# Patient Record
Sex: MALE | Race: WHITE | NOT HISPANIC OR LATINO | Employment: OTHER | RURAL
[De-identification: names, ages, dates, MRNs, and addresses within clinical notes are randomized per-mention and may not be internally consistent; named-entity substitution may affect disease eponyms.]

---

## 2022-01-11 DIAGNOSIS — M25.511 RIGHT SHOULDER PAIN: Primary | ICD-10-CM

## 2022-01-13 ENCOUNTER — OFFICE VISIT (OUTPATIENT)
Dept: ORTHOPEDICS | Facility: CLINIC | Age: 72
End: 2022-01-13
Payer: MEDICARE

## 2022-01-13 ENCOUNTER — HOSPITAL ENCOUNTER (OUTPATIENT)
Dept: RADIOLOGY | Facility: HOSPITAL | Age: 72
Discharge: HOME | End: 2022-01-13
Attending: ORTHOPAEDIC SURGERY
Payer: MEDICARE

## 2022-01-13 ENCOUNTER — CLINICAL SUPPORT (OUTPATIENT)
Dept: CARDIOLOGY | Facility: CLINIC | Age: 72
End: 2022-01-13
Payer: MEDICARE

## 2022-01-13 VITALS — HEIGHT: 75 IN | BODY MASS INDEX: 27.35 KG/M2 | WEIGHT: 220 LBS

## 2022-01-13 DIAGNOSIS — Z01.810 PREOP CARDIOVASCULAR EXAM: ICD-10-CM

## 2022-01-13 DIAGNOSIS — Z01.812 PRE-PROCEDURE LAB EXAM: ICD-10-CM

## 2022-01-13 DIAGNOSIS — Z01.818 PRE-OP TESTING: ICD-10-CM

## 2022-01-13 DIAGNOSIS — M75.101 ROTATOR CUFF TEAR, RIGHT: Primary | ICD-10-CM

## 2022-01-13 DIAGNOSIS — M25.511 RIGHT SHOULDER PAIN: ICD-10-CM

## 2022-01-13 PROCEDURE — 99204 OFFICE O/P NEW MOD 45 MIN: CPT | Mod: ,,, | Performed by: ORTHOPAEDIC SURGERY

## 2022-01-13 PROCEDURE — 99212 OFFICE O/P EST SF 10 MIN: CPT | Mod: PBBFAC

## 2022-01-13 PROCEDURE — 99204 PR OFFICE/OUTPT VISIT, NEW, LEVL IV, 45-59 MIN: ICD-10-PCS | Mod: ,,, | Performed by: ORTHOPAEDIC SURGERY

## 2022-01-13 PROCEDURE — 73030 X-RAY EXAM OF SHOULDER: CPT | Mod: TC,RT

## 2022-01-13 PROCEDURE — 73030 X-RAY EXAM OF SHOULDER: CPT | Mod: 26,RT,, | Performed by: ORTHOPAEDIC SURGERY

## 2022-01-13 PROCEDURE — 93010 ELECTROCARDIOGRAM REPORT: CPT | Mod: S$PBB,,, | Performed by: INTERNAL MEDICINE

## 2022-01-13 PROCEDURE — 73030 XR SHOULDER COMPLETE 2 OR MORE VIEWS RIGHT: ICD-10-PCS | Mod: 26,RT,, | Performed by: ORTHOPAEDIC SURGERY

## 2022-01-13 PROCEDURE — 93010 EKG 12-LEAD: ICD-10-PCS | Mod: S$PBB,,, | Performed by: INTERNAL MEDICINE

## 2022-01-13 PROCEDURE — 93005 ELECTROCARDIOGRAM TRACING: CPT | Mod: PBBFAC | Performed by: INTERNAL MEDICINE

## 2022-01-13 RX ORDER — ASCORBIC ACID 250 MG
250 TABLET ORAL DAILY
COMMUNITY

## 2022-01-13 RX ORDER — METFORMIN HYDROCHLORIDE 500 MG/1
500 TABLET ORAL 2 TIMES DAILY WITH MEALS
COMMUNITY

## 2022-01-13 RX ORDER — CYCLOBENZAPRINE HCL 10 MG
10 TABLET ORAL 3 TIMES DAILY PRN
COMMUNITY

## 2022-01-13 RX ORDER — CALCIUM CARBONATE 200(500)MG
1 TABLET,CHEWABLE ORAL DAILY
COMMUNITY

## 2022-01-13 RX ORDER — KETOROLAC TROMETHAMINE 10 MG/1
10 TABLET, FILM COATED ORAL EVERY 6 HOURS
Status: ON HOLD | COMMUNITY
End: 2022-01-17 | Stop reason: HOSPADM

## 2022-01-13 RX ORDER — POLYETHYLENE GLYCOL 3350 17 G/17G
17 POWDER, FOR SOLUTION ORAL
COMMUNITY

## 2022-01-13 RX ORDER — NAPROXEN SODIUM 220 MG/1
81 TABLET, FILM COATED ORAL DAILY
COMMUNITY

## 2022-01-13 RX ORDER — ONDANSETRON 8 MG/1
8 TABLET, ORALLY DISINTEGRATING ORAL ONCE
COMMUNITY

## 2022-01-13 RX ORDER — PANTOPRAZOLE SODIUM 40 MG/1
40 TABLET, DELAYED RELEASE ORAL DAILY
COMMUNITY

## 2022-01-13 RX ORDER — CHOLECALCIFEROL (VITAMIN D3) 25 MCG
1000 TABLET ORAL DAILY
COMMUNITY

## 2022-01-13 RX ORDER — INSULIN GLARGINE 100 [IU]/ML
INJECTION, SOLUTION SUBCUTANEOUS NIGHTLY
COMMUNITY

## 2022-01-13 RX ORDER — DIPHENOXYLATE HYDROCHLORIDE AND ATROPINE SULFATE 2.5; .025 MG/1; MG/1
1 TABLET ORAL 4 TIMES DAILY PRN
COMMUNITY

## 2022-01-13 RX ORDER — DOCUSATE SODIUM 100 MG/1
100 CAPSULE, LIQUID FILLED ORAL 2 TIMES DAILY
COMMUNITY

## 2022-01-13 RX ORDER — EZETIMIBE 10 MG/1
10 TABLET ORAL DAILY
COMMUNITY

## 2022-01-13 RX ORDER — LISINOPRIL 2.5 MG/1
2.5 TABLET ORAL DAILY
COMMUNITY

## 2022-01-13 RX ORDER — ALLOPURINOL 300 MG/1
300 TABLET ORAL DAILY
COMMUNITY

## 2022-01-13 RX ORDER — MUPIROCIN 20 MG/G
OINTMENT TOPICAL
Status: CANCELLED | OUTPATIENT
Start: 2022-01-13

## 2022-01-13 RX ORDER — LOTEPREDNOL ETABONATE 2 MG/ML
1 SUSPENSION/ DROPS OPHTHALMIC 4 TIMES DAILY
COMMUNITY

## 2022-01-13 RX ORDER — TRAMADOL HYDROCHLORIDE 50 MG/1
50 TABLET ORAL EVERY 6 HOURS PRN
COMMUNITY

## 2022-01-13 RX ORDER — AMILORIDE HYDROCHLORIDE 5 MG/1
5 TABLET ORAL DAILY
COMMUNITY

## 2022-01-13 RX ORDER — DICLOFENAC SODIUM 30 MG/G
1 GEL TOPICAL 2 TIMES DAILY
COMMUNITY

## 2022-01-13 RX ORDER — ROSUVASTATIN CALCIUM 10 MG/1
10 TABLET, COATED ORAL DAILY
COMMUNITY

## 2022-01-13 RX ORDER — SODIUM CHLORIDE 9 MG/ML
INJECTION, SOLUTION INTRAVENOUS CONTINUOUS
Status: CANCELLED | OUTPATIENT
Start: 2022-01-13

## 2022-01-13 NOTE — PROGRESS NOTES
ASSESSMENT:      ICD-10-CM ICD-9-CM   1. Rotator cuff tear, right  M75.101 840.4   2. Preop cardiovascular exam  Z01.810 V72.81   3. Pre-procedure lab exam  Z01.812 V72.63   4. Pre-op testing  Z01.818 V72.84       PLAN:     -Findings and treatment options were discussed with the patient  -All questions answered  Natural history and expected course discussed. Questions answered.  Educational material distributed.  Reduction in offending activity.  MRI findings were reviewed with the patient.  The patient has a symptomatic full-thickness rotator cuff tear with associated impingement findings.  Muscle quality is well maintained.  Treatment options were reviewed to include both operative and nonoperative measures.  The patient has failed an initial course of conservative treatment.  Given the extent and duration of the patient's complaints, operative intervention is certainly reasonable at this point.  The details of right shoulder arthroscopic rotator cuff repair with subacromial decompression, biceps tenotomy versus tenodesis, labral debridement, distal clavicle excision, and possible Regeneten patch augmentation were discussed.    The patient understands the likely convalescence after surgery, in particular the expected postop rehab and recovery course. Alternatives both operative and non-operative with associated risks and benefits discussed. The outlined risks and potential complications of the proposed procedure include but are not limited to: infection, poor wound healing, scarring, stiffness, swelling, continued or recurrent pain, instability, hardware or prosthetic failure, symptomatic hardware requiring removal, weakness, neurovascular injury, numbness, chronic regional pain disorder, tissue nonhealing/irreparability/retear, contralateral ligament injury, chondral injury, arthritis, fracture, blood clot formation, inability to return to previous level of activity, anesthetic or regional block complication up  to death, need for additional procedure as indicated, and potential need for further surgery.     he was also informed and understands the risks of surgery are greater for patients with a current condition or history of heart disease, obesity, clotting disorders, recurrent infections, steroid use, current or past smoking, and factors such as sedentary lifestyle and noncompliance with medications, therapy or follow-up. The degree of the increased risk is hard to estimate with any degree of precision.    All questions were answered. The patient has verbalized understanding of these issues and wishes to proceed as discussed.   Will proceed with medical clearance.    All questions were answered. The patient has verbalized understanding of these issues and wishes to proceed as discussed. The patient understands that recovery time can be up to 6-12 months.    There are no Patient Instructions on file for this visit.    IMAGING:  No results found.   Radiographs of the right shoulder obtained today demonstrating mild to moderate AC joint space narrowing as well as subtle arthritis of the glenohumeral joint    An MRI was reviewed from [facility] which demonstrates a full-thickness retracted tear of 2 cm involving the supraspinatus and infraspinatus.  Subscapularis appears to have some chronic changes as well and there is evidence of prior subscapularis repair        CC: Shoulder pain   71 y.o. Male who presents as a new patient to me for evaluation of right shoulder pain.    Occupation:  Retired.   Pain has been present for 1 month.  Description of Injury:  He had a fall while hunting.  He landed on his right shoulder.  Has had prior right shoulder arthroscopic surgery including subscapularis repair in 2019.  Pain has persisted over the last month following this fall.  He has had a repeat MRI which is confirmed a large tear of his rotator cuff.  he has had formal physical therapy  he has had previous shoulder  injections.   he has had advanced imaging such as MRI.   The shoulder pain worsens with activity and overhead motion. Pain is disruptive to sleep at night. The pain is better with rest. Treatment thus far has included rest and activity modification. Here today to discuss diagnosis and treatment options.   VAS Pain Scale:  9  SANE Score: 40       PAST MEDICAL HISTORY:   Past Medical History:   Diagnosis Date    Acoustic neuroma     Diabetes     Diabetic neuropathy     Diabetic retinopathy     Glaucoma     Kidney stone     [place] spotted fever      PAST SURGICAL HISTORY:  Past Surgical History:   Procedure Laterality Date    ANTERIOR CRUCIATE LIGAMENT REPAIR Right 2010    appenedectomy  1961    ARTHROSCOPY OF KNEE Right 1986    BIOPSY OF INTESTINE      CARPAL TUNNEL RELEASE Left 2012    CARPAL TUNNEL RELEASE Right 2012    CLOSED REDUCTION OF FRACTURE OF NASAL BONE  2017    EYE SURGERY      GLAUCOMA SURGERY      KNEE ARTHROPLASTY Right 2017    Dr. Malik Patterson     LITHOTRIPSY  2013    PROSTATE BIOPSY      REPAIR OF ACHILLES TENDON Left 1963    ROTATOR CUFF REPAIR Left 2016    dr. Malik Patterson    transurethral ablasion      TRIGGER FINGER RELEASE  2013    URETEROSCOPY  2014     MEDICATIONS:    Current Outpatient Medications:     allopurinoL (ZYLOPRIM) 300 MG tablet, Take 300 mg by mouth once daily., Disp: , Rfl:     aMILoride (MIDAMOR) 5 MG Tab, Take 5 mg by mouth once daily., Disp: , Rfl:     ascorbic acid, vitamin C, (VITAMIN C) 250 MG tablet, Take 250 mg by mouth once daily., Disp: , Rfl:     aspirin 81 MG Chew, Take 81 mg by mouth once daily., Disp: , Rfl:     calcium carbonate (TUMS) 200 mg calcium (500 mg) chewable tablet, Take 1 tablet by mouth once daily., Disp: , Rfl:     cyclobenzaprine (FLEXERIL) 10 MG tablet, Take 10 mg by mouth 3 (three) times daily as needed for Muscle spasms., Disp: , Rfl:     diclofenac sodium (SOLARAZE) 3 % gel, Apply 1 % topically 2 (two) times  daily., Disp: , Rfl:     diphenoxylate-atropine 2.5-0.025 mg (LOMOTIL) 2.5-0.025 mg per tablet, Take 1 tablet by mouth 4 (four) times daily as needed for Diarrhea., Disp: , Rfl:     docusate sodium (COLACE) 100 MG capsule, Take 100 mg by mouth 2 (two) times daily., Disp: , Rfl:     ezetimibe (ZETIA) 10 mg tablet, Take 10 mg by mouth once daily., Disp: , Rfl:     insulin glargine (LANTUS) 100 unit/mL injection, Inject into the skin every evening., Disp: , Rfl:     insulin lispro protamin-lispro 100 unit/mL (50-50) InPn, Inject into the skin., Disp: , Rfl:     ketorolac (TORADOL) 10 mg tablet, Take 10 mg by mouth every 6 (six) hours., Disp: , Rfl:     Lactobacillus rhamnosus GG (CULTURELLE) 10 billion cell capsule, Take 1 capsule by mouth once daily., Disp: , Rfl:     lisinopriL (PRINIVIL,ZESTRIL) 2.5 MG tablet, Take 2.5 mg by mouth once daily., Disp: , Rfl:     loteprednol (ALREX) 0.2 % DrpS, 1 drop 4 (four) times daily., Disp: , Rfl:     metFORMIN (GLUCOPHAGE) 500 MG tablet, Take 500 mg by mouth 2 (two) times daily with meals., Disp: , Rfl:     ondansetron (ZOFRAN-ODT) 8 MG TbDL, Take 8 mg by mouth once., Disp: , Rfl:     pantoprazole (PROTONIX) 40 MG tablet, Take 40 mg by mouth once daily., Disp: , Rfl:     polyethylene glycol (GLYCOLAX) 17 gram PwPk, Take 17 g by mouth., Disp: , Rfl:     rosuvastatin (CRESTOR) 10 MG tablet, Take 10 mg by mouth once daily., Disp: , Rfl:     traMADoL (ULTRAM) 50 mg tablet, Take 50 mg by mouth every 6 (six) hours as needed for Pain., Disp: , Rfl:     vitamin D (VITAMIN D3) 1000 units Tab, Take 1,000 Units by mouth once daily., Disp: , Rfl:   ALLERGIES:  Review of patient's allergies indicates:   Allergen Reactions    Combigan [brimonidine-timolol]     Lescol [fluvastatin]     Lipitor [atorvastatin]     Lumigan [bimatoprost]     Neurontin [gabapentin]     Opioids - morphine analogues     Pravachol [pravastatin]     Zocor [simvastatin]     Zofran [ondansetron  hcl]      REVIEW OF SYSTEMS:  Constitution: Negative. Negative for chills, fever and night sweats.    Hematologic/Lymphatic: Negative for bleeding problem. Does not bruise/bleed easily.   Skin: Negative for dry skin, itching and rash.   Musculoskeletal: Negative for falls. Positive for shoulder pain and muscle weakness.   All other review of symptoms were reviewed and found to be noncontributory.  PHYSICAL EXAMINATION:  General    Nursing note and vitals reviewed.  Constitutional: He is oriented to person, place, and time. He appears well-developed and well-nourished. No distress.   HENT:   Head: Normocephalic and atraumatic.   Neck: Neck supple.   Cardiovascular: Normal rate, regular rhythm and intact distal pulses.    Pulmonary/Chest: Effort normal. No respiratory distress. He exhibits no tenderness.   Abdominal: Soft. He exhibits no distension. There is no abdominal tenderness.   Neurological: He is alert and oriented to person, place, and time. He has normal reflexes. He displays normal reflexes. No cranial nerve deficit. He exhibits normal muscle tone.   Psychiatric: He has a normal mood and affect. His behavior is normal. Judgment and thought content normal.         Right Shoulder Exam     Inspection/Observation   Swelling: present  Scapular Dyskinesia: positive    Tenderness   The patient is tender to palpation of the acromioclavicular joint, supraspinatus, greater tuberosity and biceps tendon.    Crepitus   The patient has crepitus of the AC joint and greater tuberosity.    Range of Motion   Active abduction: abnormal   Extension: abnormal   Forward Flexion: abnormal   Forward Elevation: abnormal  Adduction: abnormal    Tests & Signs   Cross arm: positive  Drop arm: positive  Santoyo test: positive  Impingement: positive  Sulcus: absent  Rotator Cuff Painful Arc/Range: moderate  Anterosuperior Escape: negative  Lag Sign 90 degrees: positive  Lift Off Sign: positive  Belly Press: positive  Bear Hug:  positive  Jerk Test: negative    Other   Sensation: normal    Comments:  Patient demonstrates evidence of pseudoparalysis with limited active forward elevation    Left Shoulder Exam   Left shoulder exam is normal.      Muscle Strength   Right Upper Extremity   Supraspinatus: 3/5   Subscapularis: 3/5   Biceps: 4/5     Reflexes     Right Side   Right Bell's Sign:  absent    Vascular Exam     Right Pulses      Radial:                    2+              Orders Placed This Encounter   Procedures    Basic Metabolic Panel     Standing Status:   Future     Number of Occurrences:   1     Standing Expiration Date:   3/14/2023    COVID-19 Routine Screening     Standing Status:   Future     Number of Occurrences:   1     Standing Expiration Date:   3/14/2023     Order Specific Question:   Is the patient symptomatic?     Answer:   No     Order Specific Question:   Is this needed for pre-procedure or pre-op testing?     Answer:   Yes     Order Specific Question:   Diagnosis:     Answer:   Pre-op testing [935855]    Diet NPO     Specify start time     Standing Status:   Standing     Number of Occurrences:   1    Cleanse with Chlorhexidine (CHG)     Cleanse surgical site area using 2-4% CHG prior to procedure/surgery     Standing Status:   Standing     Number of Occurrences:   1    Place CHERELLE hose     Standing Status:   Standing     Number of Occurrences:   1    Place sequential compression device     Standing Status:   Standing     Number of Occurrences:   1    Full code     Standing Status:   Standing     Number of Occurrences:   1    EKG 12-lead     Standing Status:   Future     Standing Expiration Date:   1/13/2023     Order Specific Question:   Diagnosis     Answer:   Preop cardiovascular exam [551266]    Case Request Operating Room: REPAIR, ROTATOR CUFF, ARTHROSCOPIC, ARTHROSCOPY, SHOULDER, WITH SUBACROMIAL SPACE DECOMPRESSION, ARTHROSCOPY, SHOULDER, WITH DISTAL CLAVICLE EXCISION, REPAIR, TENDON, BICEPS     Order  Specific Question:   Medical Necessity:     Answer:   Medically Non-Urgent [100]     Order Specific Question:   CPT Code:     Answer:   AK SHLDR ARTHROSCOP,PART ACROMIOPLAS [35368]     Order Specific Question:   CPT Code:     Answer:   AK SHLDR ARTHROSCOP,SURG,W/ROTAT CUFF REPR [41716]     Order Specific Question:   CPT Code:     Answer:   AK SHLDR ARTHROSCOP,PART SYNOVECT [35453]     Order Specific Question:   CPT Code:     Answer:   AK ARTHROSCOPY SHOULDER SURGICAL BICEPS TENODESIS [53385]     Order Specific Question:   CPT Code:     Answer:   AK SHLDR ARTHROSCOP,SURG,DIS CLAVICULECTOMY [49672]     Order Specific Question:   Is an on-site pathologist required for this procedure?     Answer:   N/A       Procedures

## 2022-01-13 NOTE — LETTER
Christian Patterson M.D.  [address] [place] [address], [place] [address]  [phone]  [phone]          Dwight Crowley  1950 is scheduled for right shoulder arthroscopy with rotator cuff repair.     The surgery date is 1/17/21    We are requesting the patient have a letter saying he is cleared for this surgery     *If the patient is diabetic, a HgbA1c is required prior to surgery*      The following anti-coagulation medicine should be held prior to surgery:    3 DAYS BEFORE    7 DAYS  -Eliquis            -Plavix                                -Pradaxa     -Effient  -Xarelto     -Brilinta        -Aggrenox    5 DAYS      10 DAYS  -Arixtra      -Herbal/Vits     -Coumadin     -Aspirin 325mg  -Nsaids             Thank you so much for the help in taking care of Dwight Crowley!              Austin Lira/Dr. Christian Patterson M.D.

## 2022-01-13 NOTE — LETTER
Christian Patterson M.D.  [address] [place] [address], [place] [address]  [phone]  [phone]          Diwght Crowley  1950 is scheduled for right shoulder arthroscopy with rotator cuff repair. .     The surgery date is Monday 1/17/22    We are requesting clearance from a cardiac standpoint.              Thank you so much for the help in taking care of Dwight Crowley!          Dr. Christian Patterson M.D.

## 2022-01-13 NOTE — H&P (VIEW-ONLY)
ASSESSMENT:      ICD-10-CM ICD-9-CM   1. Rotator cuff tear, right  M75.101 840.4   2. Preop cardiovascular exam  Z01.810 V72.81   3. Pre-procedure lab exam  Z01.812 V72.63   4. Pre-op testing  Z01.818 V72.84       PLAN:     -Findings and treatment options were discussed with the patient  -All questions answered  Natural history and expected course discussed. Questions answered.  Educational material distributed.  Reduction in offending activity.  MRI findings were reviewed with the patient.  The patient has a symptomatic full-thickness rotator cuff tear with associated impingement findings.  Muscle quality is well maintained.  Treatment options were reviewed to include both operative and nonoperative measures.  The patient has failed an initial course of conservative treatment.  Given the extent and duration of the patient's complaints, operative intervention is certainly reasonable at this point.  The details of right shoulder arthroscopic rotator cuff repair with subacromial decompression, biceps tenotomy versus tenodesis, labral debridement, distal clavicle excision, and possible Regeneten patch augmentation were discussed.    The patient understands the likely convalescence after surgery, in particular the expected postop rehab and recovery course. Alternatives both operative and non-operative with associated risks and benefits discussed. The outlined risks and potential complications of the proposed procedure include but are not limited to: infection, poor wound healing, scarring, stiffness, swelling, continued or recurrent pain, instability, hardware or prosthetic failure, symptomatic hardware requiring removal, weakness, neurovascular injury, numbness, chronic regional pain disorder, tissue nonhealing/irreparability/retear, contralateral ligament injury, chondral injury, arthritis, fracture, blood clot formation, inability to return to previous level of activity, anesthetic or regional block complication up  to death, need for additional procedure as indicated, and potential need for further surgery.     he was also informed and understands the risks of surgery are greater for patients with a current condition or history of heart disease, obesity, clotting disorders, recurrent infections, steroid use, current or past smoking, and factors such as sedentary lifestyle and noncompliance with medications, therapy or follow-up. The degree of the increased risk is hard to estimate with any degree of precision.    All questions were answered. The patient has verbalized understanding of these issues and wishes to proceed as discussed.   Will proceed with medical clearance.    All questions were answered. The patient has verbalized understanding of these issues and wishes to proceed as discussed. The patient understands that recovery time can be up to 6-12 months.    There are no Patient Instructions on file for this visit.    IMAGING:  No results found.   Radiographs of the right shoulder obtained today demonstrating mild to moderate AC joint space narrowing as well as subtle arthritis of the glenohumeral joint    An MRI was reviewed from [facility] which demonstrates a full-thickness retracted tear of 2 cm involving the supraspinatus and infraspinatus.  Subscapularis appears to have some chronic changes as well and there is evidence of prior subscapularis repair        CC: Shoulder pain   71 y.o. Male who presents as a new patient to me for evaluation of right shoulder pain.    Occupation:  Retired.   Pain has been present for 1 month.  Description of Injury:  He had a fall while hunting.  He landed on his right shoulder.  Has had prior right shoulder arthroscopic surgery including subscapularis repair in 2019.  Pain has persisted over the last month following this fall.  He has had a repeat MRI which is confirmed a large tear of his rotator cuff.  he has had formal physical therapy  he has had previous shoulder  injections.   he has had advanced imaging such as MRI.   The shoulder pain worsens with activity and overhead motion. Pain is disruptive to sleep at night. The pain is better with rest. Treatment thus far has included rest and activity modification. Here today to discuss diagnosis and treatment options.   VAS Pain Scale:  9  SANE Score: 40       PAST MEDICAL HISTORY:   Past Medical History:   Diagnosis Date    Acoustic neuroma     Diabetes     Diabetic neuropathy     Diabetic retinopathy     Glaucoma     Kidney stone     [place] spotted fever      PAST SURGICAL HISTORY:  Past Surgical History:   Procedure Laterality Date    ANTERIOR CRUCIATE LIGAMENT REPAIR Right 2010    appenedectomy  1961    ARTHROSCOPY OF KNEE Right 1986    BIOPSY OF INTESTINE      CARPAL TUNNEL RELEASE Left 2012    CARPAL TUNNEL RELEASE Right 2012    CLOSED REDUCTION OF FRACTURE OF NASAL BONE  2017    EYE SURGERY      GLAUCOMA SURGERY      KNEE ARTHROPLASTY Right 2017    Dr. Malik Patterson     LITHOTRIPSY  2013    PROSTATE BIOPSY      REPAIR OF ACHILLES TENDON Left 1963    ROTATOR CUFF REPAIR Left 2016    dr. Malik Patterson    transurethral ablasion      TRIGGER FINGER RELEASE  2013    URETEROSCOPY  2014     MEDICATIONS:    Current Outpatient Medications:     allopurinoL (ZYLOPRIM) 300 MG tablet, Take 300 mg by mouth once daily., Disp: , Rfl:     aMILoride (MIDAMOR) 5 MG Tab, Take 5 mg by mouth once daily., Disp: , Rfl:     ascorbic acid, vitamin C, (VITAMIN C) 250 MG tablet, Take 250 mg by mouth once daily., Disp: , Rfl:     aspirin 81 MG Chew, Take 81 mg by mouth once daily., Disp: , Rfl:     calcium carbonate (TUMS) 200 mg calcium (500 mg) chewable tablet, Take 1 tablet by mouth once daily., Disp: , Rfl:     cyclobenzaprine (FLEXERIL) 10 MG tablet, Take 10 mg by mouth 3 (three) times daily as needed for Muscle spasms., Disp: , Rfl:     diclofenac sodium (SOLARAZE) 3 % gel, Apply 1 % topically 2 (two) times  daily., Disp: , Rfl:     diphenoxylate-atropine 2.5-0.025 mg (LOMOTIL) 2.5-0.025 mg per tablet, Take 1 tablet by mouth 4 (four) times daily as needed for Diarrhea., Disp: , Rfl:     docusate sodium (COLACE) 100 MG capsule, Take 100 mg by mouth 2 (two) times daily., Disp: , Rfl:     ezetimibe (ZETIA) 10 mg tablet, Take 10 mg by mouth once daily., Disp: , Rfl:     insulin glargine (LANTUS) 100 unit/mL injection, Inject into the skin every evening., Disp: , Rfl:     insulin lispro protamin-lispro 100 unit/mL (50-50) InPn, Inject into the skin., Disp: , Rfl:     ketorolac (TORADOL) 10 mg tablet, Take 10 mg by mouth every 6 (six) hours., Disp: , Rfl:     Lactobacillus rhamnosus GG (CULTURELLE) 10 billion cell capsule, Take 1 capsule by mouth once daily., Disp: , Rfl:     lisinopriL (PRINIVIL,ZESTRIL) 2.5 MG tablet, Take 2.5 mg by mouth once daily., Disp: , Rfl:     loteprednol (ALREX) 0.2 % DrpS, 1 drop 4 (four) times daily., Disp: , Rfl:     metFORMIN (GLUCOPHAGE) 500 MG tablet, Take 500 mg by mouth 2 (two) times daily with meals., Disp: , Rfl:     ondansetron (ZOFRAN-ODT) 8 MG TbDL, Take 8 mg by mouth once., Disp: , Rfl:     pantoprazole (PROTONIX) 40 MG tablet, Take 40 mg by mouth once daily., Disp: , Rfl:     polyethylene glycol (GLYCOLAX) 17 gram PwPk, Take 17 g by mouth., Disp: , Rfl:     rosuvastatin (CRESTOR) 10 MG tablet, Take 10 mg by mouth once daily., Disp: , Rfl:     traMADoL (ULTRAM) 50 mg tablet, Take 50 mg by mouth every 6 (six) hours as needed for Pain., Disp: , Rfl:     vitamin D (VITAMIN D3) 1000 units Tab, Take 1,000 Units by mouth once daily., Disp: , Rfl:   ALLERGIES:  Review of patient's allergies indicates:   Allergen Reactions    Combigan [brimonidine-timolol]     Lescol [fluvastatin]     Lipitor [atorvastatin]     Lumigan [bimatoprost]     Neurontin [gabapentin]     Opioids - morphine analogues     Pravachol [pravastatin]     Zocor [simvastatin]     Zofran [ondansetron  hcl]      REVIEW OF SYSTEMS:  Constitution: Negative. Negative for chills, fever and night sweats.    Hematologic/Lymphatic: Negative for bleeding problem. Does not bruise/bleed easily.   Skin: Negative for dry skin, itching and rash.   Musculoskeletal: Negative for falls. Positive for shoulder pain and muscle weakness.   All other review of symptoms were reviewed and found to be noncontributory.  PHYSICAL EXAMINATION:  General    Nursing note and vitals reviewed.  Constitutional: He is oriented to person, place, and time. He appears well-developed and well-nourished. No distress.   HENT:   Head: Normocephalic and atraumatic.   Neck: Neck supple.   Cardiovascular: Normal rate, regular rhythm and intact distal pulses.    Pulmonary/Chest: Effort normal. No respiratory distress. He exhibits no tenderness.   Abdominal: Soft. He exhibits no distension. There is no abdominal tenderness.   Neurological: He is alert and oriented to person, place, and time. He has normal reflexes. He displays normal reflexes. No cranial nerve deficit. He exhibits normal muscle tone.   Psychiatric: He has a normal mood and affect. His behavior is normal. Judgment and thought content normal.         Right Shoulder Exam     Inspection/Observation   Swelling: present  Scapular Dyskinesia: positive    Tenderness   The patient is tender to palpation of the acromioclavicular joint, supraspinatus, greater tuberosity and biceps tendon.    Crepitus   The patient has crepitus of the AC joint and greater tuberosity.    Range of Motion   Active abduction: abnormal   Extension: abnormal   Forward Flexion: abnormal   Forward Elevation: abnormal  Adduction: abnormal    Tests & Signs   Cross arm: positive  Drop arm: positive  Santoyo test: positive  Impingement: positive  Sulcus: absent  Rotator Cuff Painful Arc/Range: moderate  Anterosuperior Escape: negative  Lag Sign 90 degrees: positive  Lift Off Sign: positive  Belly Press: positive  Bear Hug:  positive  Jerk Test: negative    Other   Sensation: normal    Comments:  Patient demonstrates evidence of pseudoparalysis with limited active forward elevation    Left Shoulder Exam   Left shoulder exam is normal.      Muscle Strength   Right Upper Extremity   Supraspinatus: 3/5   Subscapularis: 3/5   Biceps: 4/5     Reflexes     Right Side   Right Bell's Sign:  absent    Vascular Exam     Right Pulses      Radial:                    2+              Orders Placed This Encounter   Procedures    Basic Metabolic Panel     Standing Status:   Future     Number of Occurrences:   1     Standing Expiration Date:   3/14/2023    COVID-19 Routine Screening     Standing Status:   Future     Number of Occurrences:   1     Standing Expiration Date:   3/14/2023     Order Specific Question:   Is the patient symptomatic?     Answer:   No     Order Specific Question:   Is this needed for pre-procedure or pre-op testing?     Answer:   Yes     Order Specific Question:   Diagnosis:     Answer:   Pre-op testing [711945]    Diet NPO     Specify start time     Standing Status:   Standing     Number of Occurrences:   1    Cleanse with Chlorhexidine (CHG)     Cleanse surgical site area using 2-4% CHG prior to procedure/surgery     Standing Status:   Standing     Number of Occurrences:   1    Place CHERELLE hose     Standing Status:   Standing     Number of Occurrences:   1    Place sequential compression device     Standing Status:   Standing     Number of Occurrences:   1    Full code     Standing Status:   Standing     Number of Occurrences:   1    EKG 12-lead     Standing Status:   Future     Standing Expiration Date:   1/13/2023     Order Specific Question:   Diagnosis     Answer:   Preop cardiovascular exam [251963]    Case Request Operating Room: REPAIR, ROTATOR CUFF, ARTHROSCOPIC, ARTHROSCOPY, SHOULDER, WITH SUBACROMIAL SPACE DECOMPRESSION, ARTHROSCOPY, SHOULDER, WITH DISTAL CLAVICLE EXCISION, REPAIR, TENDON, BICEPS     Order  Specific Question:   Medical Necessity:     Answer:   Medically Non-Urgent [100]     Order Specific Question:   CPT Code:     Answer:   N[place] SHLDR ARTHROSCOP,PART ACROMIOPLAS [94118]     Order Specific Question:   CPT Code:     Answer:   [place] SHLDR ARTHROSCOP,SURG,W/ROTAT CUFF REPR [80618]     Order Specific Question:   CPT Code:     Answer:   [place] SHLDR ARTHROSCOP,PART SYNOVECT [96373]     Order Specific Question:   CPT Code:     Answer:   NJ ARTHROSCOPY SHOULDER SURGICAL BICEPS TENODESIS [71234]     Order Specific Question:   CPT Code:     Answer:   N[place] SHLDR ARTHROSCOP,SURG,DIS CLAVICULECTOMY [21382]     Order Specific Question:   Is an on-site pathologist required for this procedure?     Answer:   N/A       Procedures

## 2022-01-17 ENCOUNTER — ANESTHESIA (OUTPATIENT)
Dept: SURGERY | Facility: HOSPITAL | Age: 72
End: 2022-01-17
Payer: MEDICARE

## 2022-01-17 ENCOUNTER — ANESTHESIA EVENT (OUTPATIENT)
Dept: SURGERY | Facility: HOSPITAL | Age: 72
End: 2022-01-17
Payer: MEDICARE

## 2022-01-17 ENCOUNTER — HOSPITAL ENCOUNTER (OUTPATIENT)
Facility: HOSPITAL | Age: 72
Discharge: HOME | End: 2022-01-17
Attending: ORTHOPAEDIC SURGERY | Admitting: ORTHOPAEDIC SURGERY
Payer: MEDICARE

## 2022-01-17 VITALS
DIASTOLIC BLOOD PRESSURE: 67 MMHG | TEMPERATURE: 98 F | BODY MASS INDEX: 28.23 KG/M2 | OXYGEN SATURATION: 90 % | HEART RATE: 64 BPM | HEIGHT: 74 IN | RESPIRATION RATE: 18 BRPM | SYSTOLIC BLOOD PRESSURE: 126 MMHG | WEIGHT: 220 LBS

## 2022-01-17 DIAGNOSIS — M75.101 ROTATOR CUFF TEAR, RIGHT: Primary | ICD-10-CM

## 2022-01-17 DIAGNOSIS — Z01.810 PREOP CARDIOVASCULAR EXAM: ICD-10-CM

## 2022-01-17 DIAGNOSIS — Z01.818 PRE-OP TESTING: ICD-10-CM

## 2022-01-17 PROBLEM — T88.4XXA HARD TO INTUBATE: Status: ACTIVE | Noted: 2022-01-17

## 2022-01-17 LAB
GLUCOSE SERPL-MCNC: 141 MG/DL (ref 70–105)
GLUCOSE SERPL-MCNC: 175 MG/DL (ref 70–105)

## 2022-01-17 PROCEDURE — D9220A PRA ANESTHESIA: Mod: ANES,,, | Performed by: ANESTHESIOLOGY

## 2022-01-17 PROCEDURE — D9220A PRA ANESTHESIA: ICD-10-PCS | Mod: ANES,,, | Performed by: ANESTHESIOLOGY

## 2022-01-17 PROCEDURE — 27000260 *HC AIRWAY ORAL

## 2022-01-17 PROCEDURE — 97161 PT EVAL LOW COMPLEX 20 MIN: CPT

## 2022-01-17 PROCEDURE — 25000003 PHARM REV CODE 250: Performed by: ORTHOPAEDIC SURGERY

## 2022-01-17 PROCEDURE — 29826 PR SHLDR ARTHROSCOP,PART ACROMIOPLAS: ICD-10-PCS | Mod: RT,,, | Performed by: ORTHOPAEDIC SURGERY

## 2022-01-17 PROCEDURE — 64415 PERIPHERAL BLOCK: ICD-10-PCS | Mod: XU,RT,, | Performed by: ANESTHESIOLOGY

## 2022-01-17 PROCEDURE — 29824 PR SHLDR ARTHROSCOP,SURG,DIS CLAVICULECTOMY: ICD-10-PCS | Mod: RT,,, | Performed by: ORTHOPAEDIC SURGERY

## 2022-01-17 PROCEDURE — 27000509 HC TUBE SALEM SUMP ANY SIZE: Performed by: ANESTHESIOLOGY

## 2022-01-17 PROCEDURE — 36000710: Performed by: ORTHOPAEDIC SURGERY

## 2022-01-17 PROCEDURE — 27000716 HC OXISENSOR PROBE, ANY SIZE

## 2022-01-17 PROCEDURE — 27000689 HC BLADE LARYNGOSCOPE ANY SIZE

## 2022-01-17 PROCEDURE — 27000655

## 2022-01-17 PROCEDURE — 29826 SHO ARTHRS SRG DECOMPRESSION: CPT | Mod: RT,,, | Performed by: ORTHOPAEDIC SURGERY

## 2022-01-17 PROCEDURE — 29827 PR SHLDR ARTHROSCOP,SURG,W/ROTAT CUFF REPR: ICD-10-PCS | Mod: RT,,, | Performed by: ORTHOPAEDIC SURGERY

## 2022-01-17 PROCEDURE — 27201480 HC GLIDESCOPE: Performed by: ANESTHESIOLOGY

## 2022-01-17 PROCEDURE — 25000003 PHARM REV CODE 250: Performed by: ANESTHESIOLOGY

## 2022-01-17 PROCEDURE — D9220A PRA ANESTHESIA: ICD-10-PCS | Mod: CRNA,,,

## 2022-01-17 PROCEDURE — 25000003 PHARM REV CODE 250

## 2022-01-17 PROCEDURE — 63600175 PHARM REV CODE 636 W HCPCS: Performed by: ORTHOPAEDIC SURGERY

## 2022-01-17 PROCEDURE — 36000711: Performed by: ORTHOPAEDIC SURGERY

## 2022-01-17 PROCEDURE — 29823 PR SHLDR ARTHROSCOP,EXTEN DEBRIDE: ICD-10-PCS | Mod: RT,,, | Performed by: ORTHOPAEDIC SURGERY

## 2022-01-17 PROCEDURE — 29827 SHO ARTHRS SRG RT8TR CUF RPR: CPT | Mod: RT,,, | Performed by: ORTHOPAEDIC SURGERY

## 2022-01-17 PROCEDURE — 71000016 HC POSTOP RECOV ADDL HR: Performed by: ORTHOPAEDIC SURGERY

## 2022-01-17 PROCEDURE — 29824 SHO ARTHRS SRG DSTL CLAVICLC: CPT | Mod: RT,,, | Performed by: ORTHOPAEDIC SURGERY

## 2022-01-17 PROCEDURE — 63600175 PHARM REV CODE 636 W HCPCS

## 2022-01-17 PROCEDURE — C1889 IMPLANT/INSERT DEVICE, NOC: HCPCS | Performed by: ORTHOPAEDIC SURGERY

## 2022-01-17 PROCEDURE — 29823 SHO ARTHRS SRG XTNSV DBRDMT: CPT | Mod: RT,,, | Performed by: ORTHOPAEDIC SURGERY

## 2022-01-17 PROCEDURE — 27000165 HC TUBE, ETT CUFFED

## 2022-01-17 PROCEDURE — 71000015 HC POSTOP RECOV 1ST HR: Performed by: ORTHOPAEDIC SURGERY

## 2022-01-17 PROCEDURE — D9220A PRA ANESTHESIA: Mod: CRNA,,,

## 2022-01-17 PROCEDURE — C1713 ANCHOR/SCREW BN/BN,TIS/BN: HCPCS | Performed by: ORTHOPAEDIC SURGERY

## 2022-01-17 PROCEDURE — 82962 GLUCOSE BLOOD TEST: CPT

## 2022-01-17 PROCEDURE — 64415 NJX AA&/STRD BRCH PLXS IMG: CPT | Mod: XU,RT,, | Performed by: ANESTHESIOLOGY

## 2022-01-17 PROCEDURE — 27201423 OPTIME MED/SURG SUP & DEVICES STERILE SUPPLY: Performed by: ORTHOPAEDIC SURGERY

## 2022-01-17 PROCEDURE — 27100168 OPTIME MED/SURG SUP & DEVICES NON-STERILE SUPPLY: Performed by: ORTHOPAEDIC SURGERY

## 2022-01-17 PROCEDURE — 71000033 HC RECOVERY, INTIAL HOUR: Performed by: ORTHOPAEDIC SURGERY

## 2022-01-17 PROCEDURE — 27200750 HC INSULATED NEEDLE/ STIMUPLEX

## 2022-01-17 PROCEDURE — 37000008 HC ANESTHESIA 1ST 15 MINUTES: Performed by: ORTHOPAEDIC SURGERY

## 2022-01-17 PROCEDURE — 37000009 HC ANESTHESIA EA ADD 15 MINS: Performed by: ORTHOPAEDIC SURGERY

## 2022-01-17 DEVICE — IMP BIOINDUCTIVE W ARTH DEL LRG: Type: IMPLANTABLE DEVICE | Site: SHOULDER | Status: FUNCTIONAL

## 2022-01-17 DEVICE — IMP TENDON ANCHORS X8: Type: IMPLANTABLE DEVICE | Site: SHOULDER | Status: FUNCTIONAL

## 2022-01-17 DEVICE — IMP ANCHOR BONE 3 W ARTHRO DEL SYS ADVCD: Type: IMPLANTABLE DEVICE | Site: SHOULDER | Status: FUNCTIONAL

## 2022-01-17 DEVICE — IMP SUTURE ANCHOR HEALICOIL RG SA 5.5 W/3 UB BL CB BL BK: Type: IMPLANTABLE DEVICE | Site: SHOULDER | Status: FUNCTIONAL

## 2022-01-17 DEVICE — IMP SUTURE ANCHOR HEALICOIL KL RGNST: Type: IMPLANTABLE DEVICE | Site: SHOULDER | Status: FUNCTIONAL

## 2022-01-17 RX ORDER — BUPIVACAINE HYDROCHLORIDE 5 MG/ML
INJECTION, SOLUTION EPIDURAL; INTRACAUDAL
Status: COMPLETED | OUTPATIENT
Start: 2022-01-17 | End: 2022-01-17

## 2022-01-17 RX ORDER — ROCURONIUM BROMIDE 10 MG/ML
INJECTION, SOLUTION INTRAVENOUS
Status: DISCONTINUED | OUTPATIENT
Start: 2022-01-17 | End: 2022-01-17

## 2022-01-17 RX ORDER — LIDOCAINE HYDROCHLORIDE 20 MG/ML
INJECTION, SOLUTION EPIDURAL; INFILTRATION; INTRACAUDAL; PERINEURAL
Status: DISCONTINUED | OUTPATIENT
Start: 2022-01-17 | End: 2022-01-17

## 2022-01-17 RX ORDER — CEFAZOLIN SODIUM 2 G/50ML
2 SOLUTION INTRAVENOUS
Status: DISCONTINUED | OUTPATIENT
Start: 2022-01-17 | End: 2022-01-17 | Stop reason: HOSPADM

## 2022-01-17 RX ORDER — PROMETHAZINE HYDROCHLORIDE 25 MG/1
25 TABLET ORAL EVERY 8 HOURS PRN
Qty: 30 TABLET | Refills: 0 | Status: SHIPPED | OUTPATIENT
Start: 2022-01-17

## 2022-01-17 RX ORDER — CEFAZOLIN SODIUM 1 G/3ML
INJECTION, POWDER, FOR SOLUTION INTRAMUSCULAR; INTRAVENOUS
Status: DISCONTINUED | OUTPATIENT
Start: 2022-01-17 | End: 2022-01-17

## 2022-01-17 RX ORDER — LIDOCAINE HYDROCHLORIDE 10 MG/ML
1 INJECTION, SOLUTION EPIDURAL; INFILTRATION; INTRACAUDAL; PERINEURAL ONCE
Status: CANCELLED | OUTPATIENT
Start: 2022-01-17 | End: 2022-01-17

## 2022-01-17 RX ORDER — SODIUM CHLORIDE, SODIUM LACTATE, POTASSIUM CHLORIDE, CALCIUM CHLORIDE 600; 310; 30; 20 MG/100ML; MG/100ML; MG/100ML; MG/100ML
INJECTION, SOLUTION INTRAVENOUS CONTINUOUS
Status: CANCELLED | OUTPATIENT
Start: 2022-01-17

## 2022-01-17 RX ORDER — DOCUSATE SODIUM 100 MG/1
100 CAPSULE, LIQUID FILLED ORAL 2 TIMES DAILY
Status: CANCELLED | OUTPATIENT
Start: 2022-01-17

## 2022-01-17 RX ORDER — MIDAZOLAM HYDROCHLORIDE 1 MG/ML
INJECTION INTRAMUSCULAR; INTRAVENOUS
Status: DISCONTINUED | OUTPATIENT
Start: 2022-01-17 | End: 2022-01-17

## 2022-01-17 RX ORDER — OXYCODONE HYDROCHLORIDE 5 MG/1
10 TABLET ORAL EVERY 4 HOURS PRN
Status: CANCELLED | OUTPATIENT
Start: 2022-01-17

## 2022-01-17 RX ORDER — SODIUM CHLORIDE 9 MG/ML
INJECTION, SOLUTION INTRAVENOUS CONTINUOUS
Status: DISCONTINUED | OUTPATIENT
Start: 2022-01-17 | End: 2022-01-17 | Stop reason: HOSPADM

## 2022-01-17 RX ORDER — HYDROMORPHONE HYDROCHLORIDE 2 MG/ML
0.5 INJECTION, SOLUTION INTRAMUSCULAR; INTRAVENOUS; SUBCUTANEOUS EVERY 5 MIN PRN
Status: DISCONTINUED | OUTPATIENT
Start: 2022-01-17 | End: 2022-01-17 | Stop reason: HOSPADM

## 2022-01-17 RX ORDER — FENTANYL CITRATE 50 UG/ML
INJECTION, SOLUTION INTRAMUSCULAR; INTRAVENOUS
Status: DISCONTINUED | OUTPATIENT
Start: 2022-01-17 | End: 2022-01-17

## 2022-01-17 RX ORDER — MEPERIDINE HYDROCHLORIDE 25 MG/ML
25 INJECTION INTRAMUSCULAR; INTRAVENOUS; SUBCUTANEOUS EVERY 10 MIN PRN
Status: DISCONTINUED | OUTPATIENT
Start: 2022-01-17 | End: 2022-01-17 | Stop reason: HOSPADM

## 2022-01-17 RX ORDER — MORPHINE SULFATE 10 MG/ML
4 INJECTION INTRAMUSCULAR; INTRAVENOUS; SUBCUTANEOUS EVERY 5 MIN PRN
Status: DISCONTINUED | OUTPATIENT
Start: 2022-01-17 | End: 2022-01-17 | Stop reason: HOSPADM

## 2022-01-17 RX ORDER — OXYCODONE AND ACETAMINOPHEN 10; 325 MG/1; MG/1
1 TABLET ORAL EVERY 6 HOURS PRN
Qty: 30 TABLET | Refills: 0 | Status: SHIPPED | OUTPATIENT
Start: 2022-01-17

## 2022-01-17 RX ORDER — ONDANSETRON 2 MG/ML
INJECTION INTRAMUSCULAR; INTRAVENOUS
Status: DISCONTINUED | OUTPATIENT
Start: 2022-01-17 | End: 2022-01-17

## 2022-01-17 RX ORDER — MUPIROCIN 20 MG/G
OINTMENT TOPICAL
Status: DISCONTINUED | OUTPATIENT
Start: 2022-01-17 | End: 2022-01-17 | Stop reason: HOSPADM

## 2022-01-17 RX ORDER — DIPHENHYDRAMINE HYDROCHLORIDE 50 MG/ML
INJECTION INTRAMUSCULAR; INTRAVENOUS
Status: DISCONTINUED | OUTPATIENT
Start: 2022-01-17 | End: 2022-01-17

## 2022-01-17 RX ORDER — ACETAMINOPHEN 10 MG/ML
1000 INJECTION, SOLUTION INTRAVENOUS ONCE
Status: CANCELLED | OUTPATIENT
Start: 2022-01-17 | End: 2022-01-17

## 2022-01-17 RX ORDER — PROPOFOL 10 MG/ML
VIAL (ML) INTRAVENOUS
Status: DISCONTINUED | OUTPATIENT
Start: 2022-01-17 | End: 2022-01-17

## 2022-01-17 RX ORDER — ONDANSETRON 4 MG/1
8 TABLET, ORALLY DISINTEGRATING ORAL EVERY 8 HOURS PRN
Status: CANCELLED | OUTPATIENT
Start: 2022-01-17

## 2022-01-17 RX ORDER — PROMETHAZINE HYDROCHLORIDE 25 MG/1
25 TABLET ORAL EVERY 6 HOURS PRN
Status: CANCELLED | OUTPATIENT
Start: 2022-01-17

## 2022-01-17 RX ORDER — DIPHENHYDRAMINE HYDROCHLORIDE 50 MG/ML
25 INJECTION INTRAMUSCULAR; INTRAVENOUS EVERY 6 HOURS PRN
Status: DISCONTINUED | OUTPATIENT
Start: 2022-01-17 | End: 2022-01-17 | Stop reason: HOSPADM

## 2022-01-17 RX ORDER — TRANEXAMIC ACID 100 MG/ML
INJECTION, SOLUTION INTRAVENOUS
Status: DISCONTINUED | OUTPATIENT
Start: 2022-01-17 | End: 2022-01-17

## 2022-01-17 RX ORDER — SODIUM CHLORIDE 0.9 % (FLUSH) 0.9 %
2 SYRINGE (ML) INJECTION
Status: DISCONTINUED | OUTPATIENT
Start: 2022-01-17 | End: 2022-01-17 | Stop reason: HOSPADM

## 2022-01-17 RX ORDER — OXYCODONE HYDROCHLORIDE 5 MG/1
5 TABLET ORAL EVERY 4 HOURS PRN
Status: CANCELLED | OUTPATIENT
Start: 2022-01-17

## 2022-01-17 RX ORDER — EPINEPHRINE CONVENIENCE KIT 1 MG/ML(1)
KIT INTRAMUSCULAR; SUBCUTANEOUS
Status: DISCONTINUED | OUTPATIENT
Start: 2022-01-17 | End: 2022-01-17 | Stop reason: HOSPADM

## 2022-01-17 RX ORDER — DEXAMETHASONE SODIUM PHOSPHATE 4 MG/ML
INJECTION, SOLUTION INTRA-ARTICULAR; INTRALESIONAL; INTRAMUSCULAR; INTRAVENOUS; SOFT TISSUE
Status: DISCONTINUED | OUTPATIENT
Start: 2022-01-17 | End: 2022-01-17

## 2022-01-17 RX ORDER — HYDROMORPHONE HYDROCHLORIDE 2 MG/ML
INJECTION, SOLUTION INTRAMUSCULAR; INTRAVENOUS; SUBCUTANEOUS
Status: DISCONTINUED | OUTPATIENT
Start: 2022-01-17 | End: 2022-01-17

## 2022-01-17 RX ADMIN — PROPOFOL 60 MG: 10 INJECTION, EMULSION INTRAVENOUS at 08:01

## 2022-01-17 RX ADMIN — DIPHENHYDRAMINE HYDROCHLORIDE 12.5 MG: 50 INJECTION, SOLUTION INTRAMUSCULAR; INTRAVENOUS at 08:01

## 2022-01-17 RX ADMIN — PROPOFOL 30 MG: 10 INJECTION, EMULSION INTRAVENOUS at 08:01

## 2022-01-17 RX ADMIN — FENTANYL CITRATE 50 MCG: 50 INJECTION INTRAMUSCULAR; INTRAVENOUS at 08:01

## 2022-01-17 RX ADMIN — DEXAMETHASONE SODIUM PHOSPHATE 4 MG: 4 INJECTION, SOLUTION INTRA-ARTICULAR; INTRALESIONAL; INTRAMUSCULAR; INTRAVENOUS; SOFT TISSUE at 08:01

## 2022-01-17 RX ADMIN — HYDROMORPHONE HYDROCHLORIDE 0.5 MG: 2 INJECTION, SOLUTION INTRAMUSCULAR; INTRAVENOUS; SUBCUTANEOUS at 10:01

## 2022-01-17 RX ADMIN — SODIUM CHLORIDE: 9 INJECTION, SOLUTION INTRAVENOUS at 07:01

## 2022-01-17 RX ADMIN — MIDAZOLAM 2 MG: 1 INJECTION INTRAMUSCULAR; INTRAVENOUS at 08:01

## 2022-01-17 RX ADMIN — HYDROMORPHONE HYDROCHLORIDE 0.5 MG: 2 INJECTION, SOLUTION INTRAMUSCULAR; INTRAVENOUS; SUBCUTANEOUS at 12:01

## 2022-01-17 RX ADMIN — TRANEXAMIC ACID 1000 MG: 100 INJECTION, SOLUTION INTRAVENOUS at 09:01

## 2022-01-17 RX ADMIN — PROPOFOL 100 MG: 10 INJECTION, EMULSION INTRAVENOUS at 08:01

## 2022-01-17 RX ADMIN — SODIUM CHLORIDE: 9 INJECTION, SOLUTION INTRAVENOUS at 09:01

## 2022-01-17 RX ADMIN — SUGAMMADEX 200 MG: 100 INJECTION, SOLUTION INTRAVENOUS at 12:01

## 2022-01-17 RX ADMIN — ROCURONIUM BROMIDE 50 MG: 10 INJECTION, SOLUTION INTRAVENOUS at 08:01

## 2022-01-17 RX ADMIN — CEFAZOLIN 2 G: 1 INJECTION, POWDER, FOR SOLUTION INTRAMUSCULAR; INTRAVENOUS; PARENTERAL at 08:01

## 2022-01-17 RX ADMIN — ONDANSETRON 4 MG: 2 INJECTION INTRAMUSCULAR; INTRAVENOUS at 08:01

## 2022-01-17 RX ADMIN — FENTANYL CITRATE 50 MCG: 50 INJECTION INTRAMUSCULAR; INTRAVENOUS at 10:01

## 2022-01-17 RX ADMIN — LIDOCAINE HYDROCHLORIDE 100 MG: 20 INJECTION, SOLUTION INTRAVENOUS at 08:01

## 2022-01-17 RX ADMIN — BUPIVACAINE HYDROCHLORIDE 13 ML: 5 INJECTION, SOLUTION EPIDURAL; INTRACAUDAL; PERINEURAL at 08:01

## 2022-01-17 RX ADMIN — PROPOFOL 50 MG: 10 INJECTION, EMULSION INTRAVENOUS at 08:01

## 2022-01-17 NOTE — ANESTHESIA PROCEDURE NOTES
Peripheral Block    Patient location during procedure: OR   Block not for primary anesthetic.  Reason for block: at surgeon's request and post-op pain management   Post-op Pain Location: Right  Start time: 1/17/2022 8:28 AM  Timeout: 1/17/2022 8:27 AM   End time: 1/17/2022 8:34 AM    Staffing  Authorizing Provider: Jarret Hirsch MD  Performing Provider: Jarret Hirsch MD    Preanesthetic Checklist  Completed: patient identified, risks and benefits discussed, pre-op evaluation and timeout performed  Peripheral Block  Patient position: supine  Prep: ChloraPrep  Block type: interscalene  Laterality: right  Injection technique: single shot  Needle  Needle localization: nerve stimulator     Assessment  Injection assessment: negative aspiration    Medications:    Medications: BUPivacaine (pf) (MARCAINE) injection 0.5%, 13 mL    Additional Notes  Exparel 133mg plus 0.5% bupivicaine 65mg.   No complications

## 2022-01-17 NOTE — OP NOTE
Surgery Date: 1/17/2022      Surgeon(s) and Role:     * Christian Patterson MD - Primary    Assistant: Austin Lira    Pre-op Diagnosis:   Rotator cuff tear, right [M75.101]    Post-op Diagnosis:  Post-Op Diagnosis Codes:     * Rotator cuff tear, right [M75.101]       Procedure:   1) Revision arthroscopic rotator cuff repair of supraspinatus, infraspinatus tendons  2) Extensive debridement of the glenohumeral joint  3) Subacromial decompression  4) Distal clavicle exicsion    Anesthesia:  Choice + interscalene block    EBL:  * No values recorded between 1/17/2022  9:08 AM and 1/17/2022 12:09 PM *     Implants:   Implant Name Type Inv. Item Serial No.  Lot No. LRB No. Used Action   IMP SUTURE ANCHOR HEALICOIL RG SA 5.5 W/3 UB BL CB BL BK - [id number] [place] IMP SUTURE ANCHOR HEALICOIL RG SA 5.5 W/3 UB BL CB BL BK  [employer] [facility] ([facility] [id number] Right 1 Implanted   IMP SUTURE ANCHOR HEALICOIL RG SA 5.5 W/3 UB BL CB BL BK - [id number] [place] IMP SUTURE ANCHOR HEALICOIL RG SA 5.5 W/3 UB BL CB BL BK  [employer] [facility] ([facility]) [id number] Right 1 Implanted   IMP SUTURE ANCHOR HEALICOIL KL RGNST - [id number] [place] IMP SUTURE ANCHOR HEALICOIL KL RGNST  [employer] [facility] ([facility]) [id number] Right 1 Implanted   IMP SUTURE ANCHOR HEALICOIL KL RGNST - [id number] [place] IMP SUTURE ANCHOR HEALICOIL KL RGNST  [employer] [facility] [employer] ([facility]) [id number] Right 1 Implanted   IMP BIOINDUCTIVE W ARTH DEL LRG - [id number] Implant IMP BIOINDUCTIVE W ARTH DEL LRG  [employer] [facility] ([facility]) [id number] Right 1 Implanted   IMP ANCHOR BONE 3 W ARTHRO DEL SYS ADVCD - [id number] [place] IMP ANCHOR BONE 3 W ARTHRO DEL SYS ADVCD  SMITH \T [employer] ([facility]) [id number] Right 1 Implanted   IMP TENDON ANCHORS X8 - [id number] [employer] IMP TENDON ANCHORS X8  [employer] [facility] ([facility]) [id number] Right 1 Implanted   IMP TENDON ANCHORS X8 - [id number] [employer] IMP TENDON  ANCHORS X8  [employer] \T\ [employer] ([facility]) [id number] Right 1 Implanted       Description of findings:  See below    Complication:   none        Procedure: The patient was taken to the operating room and was initially placed supine.  Anesthesia was administered, as was an interscalene block, and pre-operative antibiotics were given.  A timeout was performed. The patient was then placed in the lateral decubitus position on a bean bag.  All bony prominences were well padded.  An axillary roll was created and appropriately placed.  The arm was then prepped and draped in the usual sterile fashion. The arm was placed into a spider arm ibarra.  A standard posterior portal was then undertaken and a diagnostic arthroscopy was performed.   A large tear of the rotator cuff was immediately evident.  This involved the supraspinatus and infraspinatus.  The subscapularis was intact however.  Extensive mobilization techniques were then undertaken as this tendon did demonstrate significant scarring and adhesions.  Intra-articular release of adhesions was performed.  Release of the superior glenohumeral ligament coracohumeral ligament and middle glenohumeral ligament was then performed.  A release was taken to the inferior glenohumeral ligament as well.  The labrum was debrided circumferentially.  Areas of chondromalacia were debrided with a motorized shaver as well.  The rotator cuff was debrided at its footprint.  The tear type did appear to have torn just lateral to the musculotendinous junction so there was some residual tendon remaining.  There was a significant amount of tendon left attached to the rotator cuff footprint this was felt to be roughly 1.5 cm in size.  After completing the extensive debridement glenohumeral joint attention was turned towards addressing the subacromial space.  Extensive adhesions were released.    The arthroscope was then inserted into the subacromial space.  A lateral portal was  established 2-3 fingerbreadths lateral to the anterolateral edge of the acromion.  The rotator cuff was then visualized.  The tendon quality was graded as poor.  There was delamination is present within the rotator cuff.  The footprint had to be medialized we utilized a bur in order to achieve this.  The footprint was prepared in standard fashion.      The undersurface of the acromion was inspected.  The acromion was classified as Type 2 .  A posterior cutting block technique was utilized to perform a subacromial decompression.  The posterior border of the distal clavicle was used a reference point for a complete decompression, as the acromion was converted from a curved type II-III acromion to a flat, type I acromion.     The distal clavicle was then inspected.  The distal clavicle demonstrated marked arthrosis and narrowing.  A distal clavicle excision was then performed, resecting 8-10 mm of distal clavicle with an arthroscopic chandan. The superior and posterior acromioclavicular ligaments were protected. A smooth resection and thorough decompression was noted at final inspection.    Attention was then turned back towards the rotator cuff and 2 triple loaded 5.5 mm helical anchors were placed adjacent to the articular margin of the humeral head.  Sutures were then sequentially passed and horizontal mattress sutures were able to be utilized and subsequently tied with sliding SMC knots.  Two additional FiberLink anchors were also utilized.  These were then coupled into 25.5 mm helical knotless anchors on the lateral row to complete the double row repair.  To address the poor tendon quality Regeneten patch was utilized.  Will place is from posterior direction and utilized absorbable WILLAM staples to add fix this to the rotator cuff.      This completed the procedure.  Final pictures were taken, and all instruments were removed. Portals were closed with 3-0 monocryl.  Steri-strips were applied, sterile dressings were  placed, and the patient was placed into a shoulder abduction pillow sling.           Disposition:awakened from anesthesia, extubated and taken to the recovery room in a stable condition, having suffered no apparent untoward event.      Large rotator cuff repair protocol

## 2022-01-17 NOTE — ANESTHESIA PREPROCEDURE EVALUATION
01/17/2022  Dwight Crowley is a 71 y.o., male.    Anesthesia Evaluation    I have reviewed the Patient Summary Reports.   I have reviewed the NPO Status.   I have reviewed the Medications.     Review of Systems         Anesthesia Plan  Type of Anesthesia, risks & benefits discussed:  Anesthesia Type:  general, regional    Patient's Preference:   Plan Factors:          Intra-op Monitoring Plan: standard ASA monitors  Intra-op Monitoring Plan Comments:   Post Op Pain Control Plan: multimodal analgesia  Post Op Pain Control Plan Comments:     Induction:   IV  Beta Blocker:         Informed Consent: Patient understands risks and agrees with Anesthesia plan.  Questions answered. Anesthesia consent signed with patient.  ASA Score: 3     Day of Surgery Review of History & Physical:            Ready For Surgery From Anesthesia Perspective.     NPO greater than 8 hours  NAC  Allergies   Combigan [Brimonidine-timolol]  Not Specified  1/13/2022    Lescol [Fluvastatin]  Not Specified  1/13/2022    Lipitor [Atorvastatin]  Not Specified  1/13/2022    Lumigan [Bimatoprost]  Not Specified  1/13/2022    Neurontin [Gabapentin]  Not Specified  1/13/2022    Opioids - Morphine Analogues  Not Specified  1/13/2022    Pravachol [Pravastatin]  Not Specified  1/13/2022    Topamax [Topiramate]  Not Specified  1/17/2022    Zocor [Simvastatin]  Not Specified  1/13/2022    Zofran [Ondansetron Hcl]  Not Specified        1/13/22 EKG: Sinus rhythm with frequent Premature ventricular complexes   Right bundle branch block; 66 bpm     Diabetes Glaucoma   Diabetic neuropathy Diabetic retinopathy   Acoustic neuroma Kidney stone   Kermit spotted fever Diabetic retinopathy   Coronary artery disease      Airway exam deferred (COVID precautions); adequate ROM at neck.    Plan is GETA plus interscalene block

## 2022-01-17 NOTE — TRANSFER OF CARE
"Anesthesia Transfer of Care Note    Patient: Dwight Crowley    Procedure(s) Performed: Procedure(s) (LRB):  REPAIR, ROTATOR CUFF, ARTHROSCOPIC (Right)  ARTHROSCOPY, SHOULDER, WITH SUBACROMIAL SPACE DECOMPRESSION (Right)  ARTHROSCOPY, SHOULDER, WITH DISTAL CLAVICLE EXCISION (Right)  REPAIR, TENDON, BICEPS (Right)    Patient location: PACU    Anesthesia Type: general and regional    Transport from OR: Transported from OR on 6-10 L/min O2 by face mask with adequate spontaneous ventilation    Post pain: adequate analgesia    Post assessment: no apparent anesthetic complications    Post vital signs: stable    Level of consciousness: responds to stimulation and sedated    Nausea/Vomiting: no nausea/vomiting    Complications: none    Transfer of care protocol was followed      Last vitals:   Visit Vitals  BP (!) 90/45 (BP Location: Left arm, Patient Position: Lying)   Pulse 71   Temp 36.6 °C (97.8 °F) (Oral)   Resp 12   Ht 6' 2" (1.88 m)   Wt 99.8 kg (220 lb)   SpO2 (!) 93%   BMI 28.25 kg/m²     "

## 2022-01-17 NOTE — PLAN OF CARE
Large to Massive Rotator Cuff Repair (> 3cm)                                                                                             Post-operative Protocol    Ultra sling for weeks 0-4  Regular sling for weeks 4-8      Phase 0 - (QUIET)  Quiet in sling  Week 0-4 Begin active scapular retraction/protraction exercises with therapist cueing    ROM exercises elbow, hand, wrist    Phase 1 - (PASSIVE)  Pendulums to warm-up beginning week 5  Week 5-8  Supine External Rotation -0°-30° beginning at 5 weeks with progression to full PROM by 8 weeks    Supine Forward Elevation -0°-90° beginning at 5 weeks with progression to full PROM by 8 weeks    *progress to upright as tolerated with ER and FE      Phase 2 - (ACTIVE)  Pendulums to warm-up.  Week 9-12    Active Range of Motion with terminal stretch    Supine External Rotation - after 8 weeks progress GRADUALLY to full   Supine Forward Elevation - after 8 weeks; progress GRADUALLY to full    Begin active biceps   Internal Rotation - Full (begin behind the back)   Begin AROM in supine and progress to upright    Phase 3 - (RESISTED)  Pendulums to warm up and continue with phase 2.   Week 13 External and Internal Rotation    Standing forward punch     Seated rows    Shoulder shrugs and Biceps curls       Weight Training  Keep hands within eyesight, keep elbows bent, no long lever arms.  Week 16    Minimize overhead activities (below shoulder)      (No  press, pull-down behind head, or wide  bench)      Initiation of Interval Sport Programs    Golf    5-6 months  Tennis   7-8 months  Ski   7-8 months      [email or web address]                                                                       Return to Activity   Rotator Cuff Repair      Slin weeks post-operative  Showering: After 3 days, no soaking     Passive Range of Motion: Week 4 until week 8     Active Range of Motion: Weeks 9 to 12     Resistive exercise program: Approximately week 10       Running: Week 13     Gym Program:    o Pulling motions, biceps, and triceps exercises week 16, using light weights and high repetitions  o Pressing motions at 16 to 20 weeks as tolerated     Outdoor Bikin-14 weeks on road bike            Interval Sports Programs: Week 16 to 20  o Throwing  o Swimming  o Golf  o Tennis     Return to Competition: Week 20-24, based on physician clinical examination and >90% strength testing

## 2022-01-17 NOTE — ANESTHESIA PROCEDURE NOTES
Intubation    Date/Time: 1/17/2022 8:33 AM  Performed by: Rah Marina II CRNA  Authorized by: Jarret Hirsch MD     Intubation:     Induction:  Intravenous    Intubated:  Postinduction    Mask Ventilation:  Easy with oral airway    Attempts:  3    Attempted By:  Student    Method of Intubation:  Direct    Blade:  Rosie 4    Laryngeal View Grade: Grade III - only epiglottis visible      Attempted By (2nd Attempt):  CRNA    Method of Intubation (2nd Attempt):  Direct    Blade (2nd Attempt):  Rosie 4    Laryngeal View Grade (2nd Attempt): Grade IIb - only the arytenoids and epiglottis seen      Attempted By (3rd Attempt):  Student    Method of Intubation (3rd Attempt):  Video laryngoscopy    Blade (3rd Attempt):  Glidescope 4    Laryngeal View Grade (3rd Attempt): Grade I - full view of cords      Difficult Airway Encountered?: Yes      Complications:  Desaturation (clinically insignificant)    Airway Device:  Oral endotracheal tube    Airway Device Size:  7.5    Style/Cuff Inflation:  Cuffed (inflated to minimal occlusive pressure)    Tube secured:  23    Secured at:  The lips    Placement Verified By:  Capnometry    Complicating Factors:  Anterior larynx    Findings Post-Intubation:  BS equal bilateral and atraumatic/condition of teeth unchanged

## 2022-01-17 NOTE — DISCHARGE INSTRUCTIONS
"Patient Education       Shoulder Arthroscopy Discharge Instructions   About this topic   Three bones make up your shoulder joint. These are the upper arm bone, the shoulder blade, and the collarbone. The shoulder is a "ball and socket" joint. The "ball" part of the joint is the top part of your upper arm bone. The "socket" part of your joint is a cup shaped indentation in your shoulder blade. A smooth tissue called cartilage lines the ends of the bones. This helps the joint glide easier. Four muscles called the rotator cuff surround the joint. They help with movement and stability of the shoulder joint. With normal wear and tear or other problems, the cartilage, the rotator cuff or other parts can wear down. Then, the joint can become damaged. This can lead to pain and loss of motion.  Surgery may be done when therapy and drugs for pain have not helped and the problem gets worse. Shoulder arthroscopy is done to look at the shoulder joint and see what is causing your shoulder problem. The doctor makes a few small cuts and uses special tools to look inside your shoulder and repair injuries.           What care is needed at home?   · Ask your doctor what you need to do when you go home. Make sure you ask questions if you do not understand what the doctor says. This way you will know what you need to do.  · Your doctor will give you drugs for pain and to prevent infection. Make sure to take all the drugs ordered by your doctor.  · Place an ice pack or a bag of frozen peas wrapped in a towel over the painful part. Never put ice right on the skin. Do not leave the ice on more than 10 to 15 minutes at a time. You may do this a few times a day to help lessen pain and swelling.  · Wear your sling to keep the shoulder supported. Ask your doctor how long you need to use the sling.  · Talk to your doctor about how to care for your cut site. Ask your doctor about:  ? When you should change your bandages  ? When you may take a " bath or shower  ? If you need to be careful with lifting things over 10 pounds (4.5 kg)  ? When you may go back to your normal activities like work or driving  · Be sure to wash your hands before touching your wound or dressing.  · Do any exercises that your doctor shows you to do. Your doctor will probably send you to physical therapy very soon after surgery. It is important to do these exercises to get full recovery of your shoulder.  What follow-up care is needed?   · Your doctor may ask you to make visits to the office to check on your progress. Be sure to keep these visits.  · If you have stitches or staples, you will need to have them taken out. Your doctor will often want to do this in 1 to 2 weeks.  · You may also need to see a physical therapist (PT). The PT will teach you exercises to help you get back your strength and motion.  What drugs may be needed?   The doctor may order drugs to:  · Help with pain and swelling  · Prevent infection  Will physical activity be limited?   You may need to rest your shoulder for a while. You should not do physical activity that makes your health problem worse. If you run, work out, or play sports, you may not be able to do those things until your health problem gets better.   What problems could happen?   · Infection  · Bleeding  · Injury to blood vessels or nerves  · Blood clots  · Ongoing pain and stiffness  · Not able to fix the problem with arthroscopy and need to do an open surgery  What can be done to prevent this health problem?   To prevent injury to your shoulder:  · Return slowly to your regular activities. Doing too much too soon may delay healing or hurt your shoulder again.  · Avoid or use caution when playing sports that will put a lot of strain your shoulder like throwing sports and tennis. Ask your doctor what is safe to do.  · After going through a rehab program, your doctor may suggest that you continue doing activities that will strengthen your  shoulder.  · Stay active and work out to keep your muscles strong and flexible.  · Always warm up slowly and stretch your shoulder before you exercise.  When do I need to call the doctor?   · Signs of infection. These include a fever of 100.4°F (38°C) or higher, chills, pain with passing urine or not able to pass urine, wound that will not heal, vaginal itching or pain.  · Signs of wound infection. These include swelling, redness, warmth around the wound; too much pain when touched; yellowish, greenish, or bloody discharge; foul smell coming from the cut site; cut site opens up.  · Increased numbness or tingling in the arm and hand  · Hand feels cold  · You are not feeling better in 2 to 3 days or you are feeling worse  Teach Back: Helping You Understand   The Teach Back Method helps you understand the information we are giving you. After you talk with the staff, tell them in your own words what you learned. This helps to make sure the staff has described each thing clearly. It also helps to explain things that may have been confusing. Before going home, make sure you can do these  · I can tell you about my procedure.  · I can tell you what may help ease my pain.  · I can tell you how to care for my cut site.  · I can tell you what I will do if I have more pain, numbness, tingling, or swelling, or my fingers are cool or blue.  Where can I learn more?   American Academy [employer] Orth[employer]  https://orthoinfo.aaos.org/en/treatment/shoulder-arthroscopy/   NHS Choices  https://www.nhs.uk/conditions/arthroscopy/   Last Reviewed Date   2020-04-22  Consumer Information Use and Disclaimer   This information is not specific medical advice and does not replace information you receive from your health care provider. This is only a brief summary of general information. It does NOT include all information about conditions, illnesses, injuries, tests, procedures, treatments, therapies, discharge instructions or life-style  choices that may apply to you. You must talk with your health care provider for complete information about your health and treatment options. This information should not be used to decide whether or not to accept your health care providers advice, instructions or recommendations. Only your health care provider has the knowledge and training to provide advice that is right for you.  Copyright   Copyright © 2021 [employer] and its affiliates and/or licensors. All rights reserved.

## 2022-01-18 NOTE — ANESTHESIA POSTPROCEDURE EVALUATION
Anesthesia Post Evaluation    Patient: Dwight Crowley    Procedure(s) Performed: Procedure(s) (LRB):  REPAIR, ROTATOR CUFF, ARTHROSCOPIC (Right)  ARTHROSCOPY, SHOULDER, WITH SUBACROMIAL SPACE DECOMPRESSION (Right)  ARTHROSCOPY, SHOULDER, WITH DISTAL CLAVICLE EXCISION (Right)  REPAIR, TENDON, BICEPS (Right)    Final Anesthesia Type: general      Patient location during evaluation: PACU  Post-procedure vital signs: reviewed and stable  Pain management: adequate  Airway patency: patent  CONCEPCION mitigation strategies: Multimodal analgesia  PONV status at discharge: No PONV  Anesthetic complications: no      Cardiovascular status: hemodynamically stable  Respiratory status: unassisted  Hydration status: euvolemic  Follow-up not needed.          Vitals Value Taken Time   /67 01/17/22 1503   Temp 36.6 °C (97.8 °F) 01/17/22 1246   Pulse 65 01/17/22 1503   Resp 18 01/17/22 1445   SpO2 90 % 01/17/22 1450   Vitals shown include unvalidated device data.      Event Time   Out of Recovery 13:17:48         Pain/Aldo Score: Aldo Score: 9 (1/17/2022  1:05 PM)

## 2022-01-19 NOTE — PLAN OF CARE
Problem: Physical Therapy Goal  Goal: Physical Therapy Goal  Description: Short Term Goals  Independent with don/doff abd pillow  Independent with HEP    Long Term Goals  Independent with upper extremity dressing/ADL's      Outcome: Met

## 2022-01-19 NOTE — PT/OT/SLP EVAL
Physical Therapy Evaluation    Patient Name:  Dwight Crowley   MRN:  [id number]    Recommendations:     Discharge Recommendations:      Discharge Equipment Recommendations: none   Barriers to discharge: None    Assessment:     Dwight Crowley is a 71 y.o. male admitted with a medical diagnosis of <principal problem not specified>.  He presents with the following impairments/functional limitations:  decreased ROM Patient with good understanding of hep.    Rehab Prognosis: Good; patient would benefit from acute skilled PT services to address these deficits and reach maximum level of function.    Recent Surgery: Procedure(s) (LRB):  REPAIR, ROTATOR CUFF, ARTHROSCOPIC (Right)  ARTHROSCOPY, SHOULDER, WITH SUBACROMIAL SPACE DECOMPRESSION (Right)  ARTHROSCOPY, SHOULDER, WITH DISTAL CLAVICLE EXCISION (Right)  REPAIR, TENDON, BICEPS (Right) 1 Day Post-Op    Plan:     During this hospitalization, patient to be seen 1 x/week to address the identified rehab impairments via therapeutic activities and progress toward the following goals:    · Plan of Care Expires:  01/17/22    Subjective     Chief Complaint: post op pain  Patient/Family Comments/goals: plan is to dc home today  Pain/Comfort:  ·      Patients cultural, spiritual, Restorationist conflicts given the current situation:      Living Environment:  Lives with family  Prior to admission, patients level of function was independent.  Equipment used at home: none.  DME owned (not currently used): none.  Upon discharge, patient will have assistance from spouse.    Objective:     Communicated with nurse prior to session.  Patient found supine with    upon PT entry to room.    General Precautions: Standard,     Orthopedic Precautions:    Braces:    Respiratory Status: Room air    Exams:  · na     Functional Mobility:  · Bed Mobility:     · Scooting: independence    Therapeutic Activities and Exercises:   reviewed hep elbow and shoulder rom    AM-PAC 6 CLICK MOBILITY  Total Score:       Patient left supine with call button in reach.    GOALS:   Multidisciplinary Problems     Physical Therapy Goals     Not on file          Multidisciplinary Problems (Resolved)        Problem: Physical Therapy Goal    Goal Priority Disciplines Outcome Goal Variances Interventions   Physical Therapy Goal   (Resolved)     PT, PT/OT Met     Description: Short Term Goals  Independent with don/doff abd pillow  Independent with HEP    Long Term Goals  Independent with upper extremity dressing/ADL's              Problem: Physical Therapy Goal    Goal Priority Disciplines Outcome Goal Variances Interventions   Physical Therapy Goal   (Resolved)     PT, PT/OT Met     Description: Short Term Goals  Independent with don/doff abd pillow  Independent with HEP    Long Term Goals  Independent with upper extremity dressing/ADL's                       History:     Past Medical History:   Diagnosis Date    Acoustic neuroma     Coronary artery disease     Diabetes     Diabetic neuropathy     Diabetic retinopathy     Diabetic retinopathy     diabetic neuropathy     Glaucoma     Kidney stone     [place] spotted fever        Past Surgical History:   Procedure Laterality Date    ANTERIOR CRUCIATE LIGAMENT REPAIR Right 2010    appenedectomy  1961    ARTHROSCOPIC REPAIR OF ROTATOR CUFF OF SHOULDER Right 1/17/2022    Procedure: REPAIR, ROTATOR CUFF, ARTHROSCOPIC;  Surgeon: Christian Patterson MD;  Location: [facility] OR;  Service: Orthopedics;  Laterality: Right;    ARTHROSCOPY OF KNEE Right 1986    ARTHROSCOPY OF SHOULDER WITH DECOMPRESSION OF SUBACROMIAL SPACE Right 1/17/2022    Procedure: ARTHROSCOPY, SHOULDER, WITH SUBACROMIAL SPACE DECOMPRESSION;  Surgeon: Christian Patterson MD;  Location: [facility] OR;  Service: Orthopedics;  Laterality: Right;    ARTHROSCOPY OF SHOULDER WITH REMOVAL OF DISTAL CLAVICLE Right 1/17/2022    Procedure: ARTHROSCOPY, SHOULDER, WITH DISTAL CLAVICLE EXCISION;  Surgeon: Christian Patterson MD;   Location: [facility] ORTHO OR;  Service: Orthopedics;  Laterality: Right;    BICEPS TENDON REPAIR Right 1/17/2022    Procedure: REPAIR, TENDON, BICEPS;  Surgeon: Christian Patterson MD;  Location: [facility] ORTHO OR;  Service: Orthopedics;  Laterality: Right;    BIOPSY OF INTESTINE      CARPAL TUNNEL RELEASE Left 2012    CARPAL TUNNEL RELEASE Right 2012    CLOSED REDUCTION OF FRACTURE OF NASAL BONE  2017    EYE SURGERY      GLAUCOMA SURGERY      KNEE ARTHROPLASTY Right 2017    Dr. Malik Patterson     LITHOTRIPSY  2013    LITHOTRIPSY      PROSTATE BIOPSY      REPAIR OF ACHILLES TENDON Left 1963    ROTATOR CUFF REPAIR Left 2016    dr. Malik Patterson    transurethral ablasion      TRIGGER FINGER RELEASE  2013    URETEROSCOPY  2014       Time Tracking:     PT Received On: 01/17/22  PT Start Time: 1430     PT Stop Time: 1450  PT Total Time (min): 20 min     Billable Minutes: eval 20 min        01/18/2022

## 2022-02-01 ENCOUNTER — OFFICE VISIT (OUTPATIENT)
Dept: ORTHOPEDICS | Facility: CLINIC | Age: 72
End: 2022-02-01
Payer: MEDICARE

## 2022-02-01 DIAGNOSIS — Z98.890 S/P ARTHROSCOPY OF RIGHT SHOULDER: Primary | ICD-10-CM

## 2022-02-01 PROCEDURE — 99024 POSTOP FOLLOW-UP VISIT: CPT | Mod: ,,, | Performed by: ORTHOPAEDIC SURGERY

## 2022-02-01 PROCEDURE — 99024 PR POST-OP FOLLOW-UP VISIT: ICD-10-PCS | Mod: ,,, | Performed by: ORTHOPAEDIC SURGERY

## 2022-02-01 NOTE — PROGRESS NOTES
Repair, Rotator Cuff, Arthroscopic - Right, Arthroscopy, Shoulder, With Subacromial Space Decompression - Right, Arthroscopy, Shoulder, With Distal Clavicle Excision - Right, and Repair, Tendon, Biceps - Right 1/17/2022      Pt is here today for First post-operative followup of his shoulder arthroscopy.  he is doing well.  We have reviewed his findings and discussed plan of care and future treatment options, including the physical therapy plan.      Reports doing well today.PT at [facility].                                                                                PHYSICAL EXAMINATION:     Incision sites healed well. Sling is in appropriate position  No evidence of any erythema, infection or induration  Range of motion of the shoulder:  0-20 degrees external rotation  Biceps tone appears appropriate      IMAGING: no new imaging    No image results found.                                                                                   ASSESSMENT:                                                                                                                                               1. Status post shoulder arthroscopy, doing well.                                                                                                                               PLAN:                                                                                                                                                     1. Continue with PT  2. Emphasized scapular stabilization to improve overall function.  3. I have discussed return to activity in detail.  4. he will see us back in 4 weeks.                                      5. All questions were answered and he should contact us if he  has any questions or concerns in the interim.              There are no Patient Instructions on file for this visit.

## 2022-03-08 ENCOUNTER — OFFICE VISIT (OUTPATIENT)
Dept: ORTHOPEDICS | Facility: CLINIC | Age: 72
End: 2022-03-08
Payer: MEDICARE

## 2022-03-08 DIAGNOSIS — Z98.890 S/P ARTHROSCOPY OF SHOULDER: Primary | ICD-10-CM

## 2022-03-08 PROCEDURE — 99024 PR POST-OP FOLLOW-UP VISIT: ICD-10-PCS | Mod: ,,, | Performed by: ORTHOPAEDIC SURGERY

## 2022-03-08 PROCEDURE — 99024 POSTOP FOLLOW-UP VISIT: CPT | Mod: ,,, | Performed by: ORTHOPAEDIC SURGERY

## 2022-03-08 NOTE — PROGRESS NOTES
Repair, Rotator Cuff, Arthroscopic - Right, Arthroscopy, Shoulder, With Subacromial Space Decompression - Right, Arthroscopy, Shoulder, With Distal Clavicle Excision - Right, and Repair, Tendon, Biceps - Right 1/17/2022      Pt is here today for Second post-operative followup of his shoulder arthroscopy.  he is doing well.  We have reviewed his findings and discussed plan of care and future treatment options, including the physical therapy plan.      Pt is doing well since surgery, he is doing therapy twice a week, no complaints of pain .                                                                               PHYSICAL EXAMINATION:     Incision sites healed well. Sling is in appropriate position  No evidence of any erythema, infection or induration  Range of motion of the shoulder: full passive ROM  Biceps tone appears appropriate      IMAGING: no new imaging    No image results found.                                                                                   ASSESSMENT:                                                                                                                                               1. Status post shoulder arthroscopy, doing well.                                                                                                                               PLAN:                                                                                                                                                     1. Continue with PT  2. Emphasized scapular stabilization to improve overall function.  3. I have discussed return to activity in detail.  4. he will see us back in 4 weeks.                                      5. All questions were answered and he should contact us if he  has any questions or concerns in the interim.              There are no Patient Instructions on file for this visit.

## 2022-04-21 ENCOUNTER — OFFICE VISIT (OUTPATIENT)
Dept: ORTHOPEDICS | Facility: CLINIC | Age: 72
End: 2022-04-21
Payer: MEDICARE

## 2022-04-21 DIAGNOSIS — Z98.890 S/P ARTHROSCOPY OF SHOULDER: Primary | ICD-10-CM

## 2022-04-21 PROCEDURE — 99212 PR OFFICE/OUTPT VISIT, EST, LEVL II, 10-19 MIN: ICD-10-PCS | Mod: ,,, | Performed by: ORTHOPAEDIC SURGERY

## 2022-04-21 PROCEDURE — 99212 OFFICE O/P EST SF 10 MIN: CPT | Mod: ,,, | Performed by: ORTHOPAEDIC SURGERY

## 2022-04-21 NOTE — PROGRESS NOTES
CC:  Shoulder pain  72 y.o. Male returns to clinic for a follow up visit regarding     ICD-10-CM ICD-9-CM   1. S/P arthroscopy of shoulder  Z98.890 V45.89       Pt is doing well since his surgery, he is still doing therapy twice a week.       REVIEW OF SYSTEMS:   Constitution: Negative. Negative for chills, fever and night sweats.    Hematologic/Lymphatic: Negative for bleeding problem. Does not bruise/bleed easily.   Skin: Negative for dry skin, itching and rash.   Musculoskeletal: Negative for falls. Positive for hip pain and muscle weakness.   All other review of symptoms were reviewed and found to be noncontributory.   PAST MEDICAL HISTORY:   Past Medical History:   Diagnosis Date    Acoustic neuroma     Coronary artery disease     Diabetes     Diabetic neuropathy     Diabetic retinopathy     Diabetic retinopathy     diabetic neuropathy     Glaucoma     Kidney stone     Ben Mountain spotted fever      PAST SURGICAL HISTORY:   Past Surgical History:   Procedure Laterality Date    ANTERIOR CRUCIATE LIGAMENT REPAIR Right 2010    appenedectomy  1961    ARTHROSCOPIC REPAIR OF ROTATOR CUFF OF SHOULDER Right 1/17/2022    Procedure: REPAIR, ROTATOR CUFF, ARTHROSCOPIC;  Surgeon: Christian Patterson MD;  Location: [facility];  Service: Orthopedics;  Laterality: Right;    ARTHROSCOPY OF KNEE Right 1986    ARTHROSCOPY OF SHOULDER WITH DECOMPRESSION OF SUBACROMIAL SPACE Right 1/17/2022    Procedure: ARTHROSCOPY, SHOULDER, WITH SUBACROMIAL SPACE DECOMPRESSION;  Surgeon: Christian Patterson MD;  Location: [facility];  Service: Orthopedics;  Laterality: Right;    ARTHROSCOPY OF SHOULDER WITH REMOVAL OF DISTAL CLAVICLE Right 1/17/2022    Procedure: ARTHROSCOPY, SHOULDER, WITH DISTAL CLAVICLE EXCISION;  Surgeon: Christian Patterson MD;  Location: [facility];  Service: Orthopedics;  Laterality: Right;    BICEPS TENDON REPAIR Right 1/17/2022    Procedure: REPAIR, TENDON, BICEPS;  Surgeon: Christian Patterson MD;   Location: [facility] OR;  Service: Orthopedics;  Laterality: Right;    BIOPSY OF INTESTINE      CARPAL TUNNEL RELEASE Left 2012    CARPAL TUNNEL RELEASE Right 2012    CLOSED REDUCTION OF FRACTURE OF NASAL BONE  2017    EYE SURGERY      GLAUCOMA SURGERY      KNEE ARTHROPLASTY Right 2017    Dr. Malik Patterson     LITHOTRIPSY  2013    LITHOTRIPSY      PROSTATE BIOPSY      REPAIR OF ACHILLES TENDON Left 1963    ROTATOR CUFF REPAIR Left 2016    dr. Malik Patterson    transurethral ablasion      TRIGGER FINGER RELEASE  2013    URETEROSCOPY  2014         PHYSICAL EXAMINATION:  Ortho/SPM Exam  Excellent range of motion of the right shoulder.  Full forward elevation full external rotation is seen today.  Rotator cuff strength graded 4/5.    IMAGING:  No results found.       ASSESSMENT:      ICD-10-CM ICD-9-CM   1. S/P arthroscopy of shoulder  Z98.890 V45.89       PLAN:     -Findings and treatment options were discussed with the patient  -All questions answered  Natural history and expected course discussed. Questions answered.  Educational material distributed.  Reduction in offending activity.  Physical therapy referral.   Overall doing quite well.  Will see back in about 6 weeks time for recheck.      There are no Patient Instructions on file for this visit.    Orders Placed This Encounter   Procedures    Ambulatory referral/consult to Physical/Occupational Therapy       Procedures

## 2022-06-02 ENCOUNTER — OFFICE VISIT (OUTPATIENT)
Dept: ORTHOPEDICS | Facility: CLINIC | Age: 72
End: 2022-06-02
Payer: MEDICARE

## 2022-06-02 DIAGNOSIS — Z98.890 S/P ARTHROSCOPY OF SHOULDER: Primary | ICD-10-CM

## 2022-06-02 PROCEDURE — 99213 OFFICE O/P EST LOW 20 MIN: CPT | Mod: ,,, | Performed by: ORTHOPAEDIC SURGERY

## 2022-06-02 PROCEDURE — 99213 PR OFFICE/OUTPT VISIT, EST, LEVL III, 20-29 MIN: ICD-10-PCS | Mod: ,,, | Performed by: ORTHOPAEDIC SURGERY

## 2022-06-02 NOTE — PROGRESS NOTES
CC:  Knee pain    72 y.o. Male returns to clinic for a follow up visit regarding knee pain.      Pt is 4 1/2 months right shoulder arthroscopy. He is doing well, finished with therapy. He has great motion. No pain.   SANE: 90  Preop SANE: 0     Past Medical History:   Diagnosis Date    Acoustic neuroma     Coronary artery disease     Diabetes     Diabetic neuropathy     Diabetic retinopathy     Diabetic retinopathy     diabetic neuropathy     Glaucoma     Kidney stone     [place] spotted fever      Past Surgical History:   Procedure Laterality Date    ANTERIOR CRUCIATE LIGAMENT REPAIR Right 2010    appenedectomy  1961    ARTHROSCOPIC REPAIR OF ROTATOR CUFF OF SHOULDER Right 1/17/2022    Procedure: REPAIR, ROTATOR CUFF, ARTHROSCOPIC;  Surgeon: Christian Patterson MD;  Location: [facility] OR;  Service: Orthopedics;  Laterality: Right;    ARTHROSCOPY OF KNEE Right 1986    ARTHROSCOPY OF SHOULDER WITH DECOMPRESSION OF SUBACROMIAL SPACE Right 1/17/2022    Procedure: ARTHROSCOPY, SHOULDER, WITH SUBACROMIAL SPACE DECOMPRESSION;  Surgeon: Christian Patterson MD;  Location: [facility] OR;  Service: Orthopedics;  Laterality: Right;    ARTHROSCOPY OF SHOULDER WITH REMOVAL OF DISTAL CLAVICLE Right 1/17/2022    Procedure: ARTHROSCOPY, SHOULDER, WITH DISTAL CLAVICLE EXCISION;  Surgeon: Christian Patterson MD;  Location: [facility] OR;  Service: Orthopedics;  Laterality: Right;    BICEPS TENDON REPAIR Right 1/17/2022    Procedure: REPAIR, TENDON, BICEPS;  Surgeon: Christian Patterson MD;  Location: [facility] OR;  Service: Orthopedics;  Laterality: Right;    BIOPSY OF INTESTINE      CARPAL TUNNEL RELEASE Left 2012    CARPAL TUNNEL RELEASE Right 2012    CLOSED REDUCTION OF FRACTURE OF NASAL BONE  2017    EYE SURGERY      GLAUCOMA SURGERY      KNEE ARTHROPLASTY Right 2017    Dr. Malik Patterson     LITHOTRIPSY  2013    LITHOTRIPSY      PROSTATE BIOPSY      REPAIR OF ACHILLES TENDON Left 1963    ROTATOR  CUFF REPAIR Left 2016    dr. Malik Patterson    transurethral ablasion      TRIGGER FINGER RELEASE  2013    URETEROSCOPY  2014         REVIEW OF SYSTEMS:   Constitution: Negative. Negative for chills, fever and night sweats.    Hematologic/Lymphatic: Negative for bleeding problem. Does not bruise/bleed easily.   Skin: Negative for dry skin, itching and rash.   Musculoskeletal: Negative for falls. Positive for knee pain and muscle weakness.   All other review of symptoms were reviewed and found to be noncontributory.     PHYSICAL EXAMINATION:  There were no vitals taken for this visit.  General    Nursing note and vitals reviewed.  Constitutional: He is oriented to person, place, and time. He appears well-developed and well-nourished.   HENT:   Head: Normocephalic and atraumatic.   Nose: Nose normal.   Eyes: EOM are normal. Pupils are equal, round, and reactive to light.   Neck: Neck supple.   Cardiovascular: Normal rate and intact distal pulses.    Pulmonary/Chest: Effort normal. No respiratory distress. He exhibits no tenderness.   Abdominal: Soft. He exhibits no distension. There is no abdominal tenderness.   Neurological: He is alert and oriented to person, place, and time. He has normal reflexes.   Psychiatric: He has a normal mood and affect. His behavior is normal. Judgment and thought content normal.         Right Shoulder Exam     Inspection/Observation   Scars: present  Deformity: absent  Scapular Dyskinesia: negative    Range of Motion   Active abduction: normal   Passive abduction: normal   Extension: normal   Forward Flexion: normal   Adduction: normal  External Rotation 90 degrees: normal  Internal rotation 0 degrees: abnormal     Tests & Signs   Santoyo test: negative  Impingement: negative  Belly Press: negative    Other   Sensation: normal    Muscle Strength   Right Upper Extremity   Supraspinatus: 5/5   Subscapularis: 5/5   Biceps: 5/5         IMAGING:  No results found.     ASSESSMENT:      ICD-10-CM  ICD-9-CM   1. S/P arthroscopy of shoulder  Z98.890 V45.89       PLAN:     -Findings and treatment options were discussed with the patient  -All questions answered  Natural history and expected course discussed. Questions answered.  Educational materials distributed.   He is doing great at this point.  I would like to see him back in 3 months time to have a final recheck.  I am very pleased with his progress today.      There are no Patient Instructions on file for this visit.      No orders of the defined types were placed in this encounter.        Procedures

## 2022-07-20 DIAGNOSIS — M25.529 ELBOW PAIN, UNSPECIFIED LATERALITY: Primary | ICD-10-CM

## 2022-07-26 ENCOUNTER — HOSPITAL ENCOUNTER (OUTPATIENT)
Dept: RADIOLOGY | Facility: HOSPITAL | Age: 72
Discharge: HOME | End: 2022-07-26
Attending: ORTHOPAEDIC SURGERY
Payer: MEDICARE

## 2022-07-26 ENCOUNTER — OFFICE VISIT (OUTPATIENT)
Dept: ORTHOPEDICS | Facility: CLINIC | Age: 72
End: 2022-07-26
Payer: MEDICARE

## 2022-07-26 DIAGNOSIS — M70.22 OLECRANON BURSITIS OF LEFT ELBOW: Primary | ICD-10-CM

## 2022-07-26 DIAGNOSIS — M25.529 ELBOW PAIN, UNSPECIFIED LATERALITY: ICD-10-CM

## 2022-07-26 PROCEDURE — 99214 PR OFFICE/OUTPT VISIT, EST, LEVL IV, 30-39 MIN: ICD-10-PCS | Mod: ,,, | Performed by: ORTHOPAEDIC SURGERY

## 2022-07-26 PROCEDURE — 73070 X-RAY EXAM OF ELBOW: CPT | Mod: 26,LT,, | Performed by: ORTHOPAEDIC SURGERY

## 2022-07-26 PROCEDURE — 73070 XR ELBOW 2 VIEWS LEFT: ICD-10-PCS | Mod: 26,LT,, | Performed by: ORTHOPAEDIC SURGERY

## 2022-07-26 PROCEDURE — 99214 OFFICE O/P EST MOD 30 MIN: CPT | Mod: ,,, | Performed by: ORTHOPAEDIC SURGERY

## 2022-07-26 PROCEDURE — 73070 X-RAY EXAM OF ELBOW: CPT | Mod: TC,LT

## 2022-07-26 RX ORDER — CLINDAMYCIN HYDROCHLORIDE 300 MG/1
300 CAPSULE ORAL 3 TIMES DAILY
Qty: 21 CAPSULE | Refills: 0 | Status: SHIPPED | OUTPATIENT
Start: 2022-07-26 | End: 2022-08-02

## 2022-07-26 NOTE — PROGRESS NOTES
72 y.o. Male returns to clinic for a follow up visit regarding     ICD-10-CM ICD-9-CM   1. Olecranon bursitis of left elbow  M70.22 726.33       Pt is here today for follow up visit of left elbow olecranon bursitis.  He was diagnosed with septic olecranon bursitis near road trip to [place].  He had a aspiration of the olecranon bursa while at a hospital in [place].  He was treated and when sitting as well.  He has received single dose of Rocephin as well as clindamycin.  Reports that his left elbow is feeling better at this time.     Past Medical History:   Diagnosis Date    Acoustic neuroma     Coronary artery disease     Diabetes     Diabetic neuropathy     Diabetic retinopathy     Diabetic retinopathy     diabetic neuropathy     Glaucoma     Kidney stone     [place] spotted fever      Past Surgical History:   Procedure Laterality Date    ANTERIOR CRUCIATE LIGAMENT REPAIR Right 2010    appenedectomy  1961    ARTHROSCOPIC REPAIR OF ROTATOR CUFF OF SHOULDER Right 1/17/2022    Procedure: REPAIR, ROTATOR CUFF, ARTHROSCOPIC;  Surgeon: Christian Patterson MD;  Location: [facility] OR;  Service: Orthopedics;  Laterality: Right;    ARTHROSCOPY OF KNEE Right 1986    ARTHROSCOPY OF SHOULDER WITH DECOMPRESSION OF SUBACROMIAL SPACE Right 1/17/2022    Procedure: ARTHROSCOPY, SHOULDER, WITH SUBACROMIAL SPACE DECOMPRESSION;  Surgeon: Christian Pattersno MD;  Location: [facility] OR;  Service: Orthopedics;  Laterality: Right;    ARTHROSCOPY OF SHOULDER WITH REMOVAL OF DISTAL CLAVICLE Right 1/17/2022    Procedure: ARTHROSCOPY, SHOULDER, WITH DISTAL CLAVICLE EXCISION;  Surgeon: Christian Patterson MD;  Location: [facility] OR;  Service: Orthopedics;  Laterality: Right;    BICEPS TENDON REPAIR Right 1/17/2022    Procedure: REPAIR, TENDON, BICEPS;  Surgeon: Christian Patterson MD;  Location: [facility] OR;  Service: Orthopedics;  Laterality: Right;    BIOPSY OF INTESTINE      CARPAL TUNNEL RELEASE Left  2012    CARPAL TUNNEL RELEASE Right 2012    CLOSED REDUCTION OF FRACTURE OF NASAL BONE  2017    EYE SURGERY      GLAUCOMA SURGERY      KNEE ARTHROPLASTY Right 2017    Dr. Malik Patterson     LITHOTRIPSY  2013    LITHOTRIPSY      PROSTATE BIOPSY      REPAIR OF ACHILLES TENDON Left 1963    ROTATOR CUFF REPAIR Left 2016    dr. Malik Patterson    transurethral ablasion      TRIGGER FINGER RELEASE  2013    URETEROSCOPY  2014         REVIEW OF SYSTEMS:   Constitution: Negative. Negative for chills, fever and night sweats.    Hematologic/Lymphatic: Negative for bleeding problem. Does not bruise/bleed easily.   Skin: Negative for dry skin, itching and rash.   Musculoskeletal: Negative for falls. Positive for knee pain and muscle weakness.   All other review of symptoms were reviewed and found to be noncontributory.     PHYSICAL EXAMINATION:  There were no vitals taken for this visit.  Ortho/SPM Exam  I inspected his left elbow today.  He does have a degree of redness present along the posterior aspect of the left elbow.  There is no evidence of fluctuance and no particular fluid collections that are drainable at this time.  Appears to be more consistent with cellulitis    IMAGING:  X-Ray Elbow 2 Views Left  Radiographs left elbow were obtained today demonstrating the parents of mild to moderate degenerative changes within the ulnohumeral articulation.  There is also a small soft tissue shadow seen which may be consistent with patient's olecranon bursitis      ASSESSMENT:      ICD-10-CM ICD-9-CM   1. Olecranon bursitis of left elbow  M70.22 726.33       PLAN:     -Findings and treatment options were discussed with the patient  -All questions answered  Min recommend an additional 7 day course of clindamycin to resolve this bursitis and cellulitis.  I will see him back in 7 days.  Recommend no surgical intervention at this time.        There are no Patient Instructions on file for this visit.      No orders of the defined  types were placed in this encounter.        Procedures

## 2022-08-02 ENCOUNTER — OFFICE VISIT (OUTPATIENT)
Dept: ORTHOPEDICS | Facility: CLINIC | Age: 72
End: 2022-08-02
Payer: MEDICARE

## 2022-08-02 DIAGNOSIS — M70.22 OLECRANON BURSITIS OF LEFT ELBOW: Primary | ICD-10-CM

## 2022-08-02 PROCEDURE — 99212 PR OFFICE/OUTPT VISIT, EST, LEVL II, 10-19 MIN: ICD-10-PCS | Mod: ,,, | Performed by: ORTHOPAEDIC SURGERY

## 2022-08-02 PROCEDURE — 99212 OFFICE O/P EST SF 10 MIN: CPT | Mod: ,,, | Performed by: ORTHOPAEDIC SURGERY

## 2022-08-02 NOTE — PROGRESS NOTES
72 y.o. Male returns to clinic for a follow up visit regarding     ICD-10-CM ICD-9-CM   1. Olecranon bursitis of left elbow  M70.22 726.33       Pt is here for 1 week follow up left elbow olecranon bursitis. He was started on clindamycin last week and reports it seems to be improving. Pain is not as bad, swelling is down as well.        Past Medical History:   Diagnosis Date    Acoustic neuroma     Coronary artery disease     Diabetes     Diabetic neuropathy     Diabetic retinopathy     Diabetic retinopathy     diabetic neuropathy     Glaucoma     Kidney stone     [place] spotted fever      Past Surgical History:   Procedure Laterality Date    ANTERIOR CRUCIATE LIGAMENT REPAIR Right 2010    appenedectomy  1961    ARTHROSCOPIC REPAIR OF ROTATOR CUFF OF SHOULDER Right 1/17/2022    Procedure: REPAIR, ROTATOR CUFF, ARTHROSCOPIC;  Surgeon: Christian Patterson MD;  Location: [facility] OR;  Service: Orthopedics;  Laterality: Right;    ARTHROSCOPY OF KNEE Right 1986    ARTHROSCOPY OF SHOULDER WITH DECOMPRESSION OF SUBACROMIAL SPACE Right 1/17/2022    Procedure: ARTHROSCOPY, SHOULDER, WITH SUBACROMIAL SPACE DECOMPRESSION;  Surgeon: Christian Patterson MD;  Location: [facility] OR;  Service: Orthopedics;  Laterality: Right;    ARTHROSCOPY OF SHOULDER WITH REMOVAL OF DISTAL CLAVICLE Right 1/17/2022    Procedure: ARTHROSCOPY, SHOULDER, WITH DISTAL CLAVICLE EXCISION;  Surgeon: Christian Patterson MD;  Location: [facility] OR;  Service: Orthopedics;  Laterality: Right;    BICEPS TENDON REPAIR Right 1/17/2022    Procedure: REPAIR, TENDON, BICEPS;  Surgeon: Christian Patterson MD;  Location: [facility] OR;  Service: Orthopedics;  Laterality: Right;    BIOPSY OF INTESTINE      CARPAL TUNNEL RELEASE Left 2012    CARPAL TUNNEL RELEASE Right 2012    CLOSED REDUCTION OF FRACTURE OF NASAL BONE  2017    EYE SURGERY      GLAUCOMA SURGERY      KNEE ARTHROPLASTY Right 2017    Dr. Malik Patterson     LITHOTRIPSY   2013    LITHOTRIPSY      PROSTATE BIOPSY      REPAIR OF ACHILLES TENDON Left 1963    ROTATOR CUFF REPAIR Left 2016    dr. Malik Patterson    transurethral ablasion      TRIGGER FINGER RELEASE  2013    URETEROSCOPY  2014         REVIEW OF SYSTEMS:   All other review of symptoms were reviewed and found to be noncontributory.     PHYSICAL EXAMINATION:  There were no vitals taken for this visit.  Ortho/SPM Exam    He still has moderate swelling along the olecranon bursa and very mild erythema but no induration or fluctuance or redness.  IMAGING:    No new xrays  ASSESSMENT:      ICD-10-CM ICD-9-CM   1. Olecranon bursitis of left elbow  M70.22 726.33       PLAN:     -Findings and treatment options were discussed with the patient  -All questions answered  He has responded well antibiotics.  He is instructed return to clinic if this olecranon bursitis does recur.  Continue to resume all activity as tolerated.  Will follow-up as needed.        There are no Patient Instructions on file for this visit.      No orders of the defined types were placed in this encounter.        Procedures

## (undated) DEVICE — TUBING SUCTION 5MM STERILE 3/16IN X 12FT

## (undated) DEVICE — MARKER SURGICAL PEN & RULER STERILE

## (undated) DEVICE — GLOVE SURGICAL PROTEXIS PI BLUE SIZE 8.5

## (undated) DEVICE — NEEDLE SPINAL 18G X 3 1/2IN

## (undated) DEVICE — Device

## (undated) DEVICE — SHAVER SYNNOVATOR PLAT SERIES 4.5MM

## (undated) DEVICE — SLING ARM ULTRA III LG

## (undated) DEVICE — GLOVE SURGICAL PROTEXIS PI CLASSIC SIZE 8.0

## (undated) DEVICE — WATER BOOM FLOOR SUCTION STRIP

## (undated) DEVICE — STOCKINETTE IMPERVIOUS MEDIUM STL

## (undated) DEVICE — BLADE HELICUT TM

## (undated) DEVICE — ADHESIVE LIQUID MASTISOL 2/3CC

## (undated) DEVICE — WAND TURBOVAC SUP AMBIENT 90 IF S

## (undated) DEVICE — COVER LIGHT HANDLE FLEX PK/2

## (undated) DEVICE — SUTURE GRASPER 60 DEG IDEAL

## (undated) DEVICE — INCISOR BOXED PLUS BL

## (undated) DEVICE — GLOVE SURGICAL PROTEXIS PI CLASSIC SIZE 7.0

## (undated) DEVICE — SOL IRRIGATION SALINE 3000ML BAG

## (undated) DEVICE — SUTURE PASSER SELF STR

## (undated) DEVICE — GAUZE XEROFORM 1X8IN

## (undated) DEVICE — DRAPE U STERI 1015 CLEAR 47 1/8X51 1/8

## (undated) DEVICE — GOWN SURGICAL SMARTGOWN LEVEL 4 / EXTRA LARGE STERILE

## (undated) DEVICE — CANISTER SUCTION 12000ML DISPOSABLE

## (undated) DEVICE — CANNULA THRD FLEX 8X72MM

## (undated) DEVICE — STRIP CLOSURE SKIN 1/2 X 4 IN

## (undated) DEVICE — DRAPE SURGICAL STERILE HD SHOULDER W/POUCH 59 X 99IN

## (undated) DEVICE — KIT STABILIZATION SHOULDER SPIDER2

## (undated) DEVICE — SPONGE GAUZE 4X4 12 PLY STL AMD 10/TRAY

## (undated) DEVICE — DRESSING ABD SURGIPAD 8X7.5IN

## (undated) DEVICE — WRAP COBAN SELF ADHERENT 4IN X 5YD STERILE

## (undated) DEVICE — APPLICATOR CHLORAPREP HI-LITE TINTED ORANGE 26ML

## (undated) DEVICE — GLOVE SURGICAL PROTEXIS PI BLUE SIZE 7.0

## (undated) DEVICE — SUTURE MONOCRYL 3-0 PS-2 27IN

## (undated) DEVICE — TUBING ARTHRO STRYKER

## (undated) DEVICE — SUTURE FIBERLINK

## (undated) DEVICE — POUCH INSTRUMENT ADHESIVE STERILE 6.75X11.5IN

## (undated) DEVICE — FILM IOBAN ANTIMICROBL 35X35CM

## (undated) DEVICE — GLOVE SURGICAL PROTEXIS PI SIZE 7.5